# Patient Record
Sex: MALE | Race: WHITE | NOT HISPANIC OR LATINO | Employment: FULL TIME | URBAN - METROPOLITAN AREA
[De-identification: names, ages, dates, MRNs, and addresses within clinical notes are randomized per-mention and may not be internally consistent; named-entity substitution may affect disease eponyms.]

---

## 2019-08-05 ENCOUNTER — OFFICE VISIT (OUTPATIENT)
Dept: FAMILY MEDICINE | Facility: CLINIC | Age: 24
End: 2019-08-05
Payer: COMMERCIAL

## 2019-08-05 VITALS
SYSTOLIC BLOOD PRESSURE: 116 MMHG | HEART RATE: 71 BPM | BODY MASS INDEX: 29.55 KG/M2 | TEMPERATURE: 97.7 F | RESPIRATION RATE: 18 BRPM | OXYGEN SATURATION: 99 % | WEIGHT: 223 LBS | HEIGHT: 73 IN | DIASTOLIC BLOOD PRESSURE: 75 MMHG

## 2019-08-05 DIAGNOSIS — L98.9 SKIN LESION: ICD-10-CM

## 2019-08-05 DIAGNOSIS — Z11.3 SCREEN FOR STD (SEXUALLY TRANSMITTED DISEASE): ICD-10-CM

## 2019-08-05 DIAGNOSIS — Z00.00 HEALTH CARE MAINTENANCE: Primary | ICD-10-CM

## 2019-08-05 DIAGNOSIS — R53.83 FATIGUE, UNSPECIFIED TYPE: ICD-10-CM

## 2019-08-05 RX ORDER — BUSPIRONE HYDROCHLORIDE 15 MG/1
15 TABLET ORAL DAILY
Refills: 0 | COMMUNITY
Start: 2019-05-16 | End: 2022-02-23

## 2019-08-05 RX ORDER — DEXMETHYLPHENIDATE HYDROCHLORIDE 10 MG/1
10 TABLET ORAL
Refills: 0 | COMMUNITY
Start: 2019-07-07 | End: 2022-02-23

## 2019-08-05 RX ORDER — SERTRALINE HYDROCHLORIDE 100 MG/1
200 TABLET, FILM COATED ORAL DAILY
Refills: 0 | COMMUNITY
Start: 2019-06-10

## 2019-08-05 RX ORDER — CYPROHEPTADINE HYDROCHLORIDE 4 MG/1
TABLET ORAL PRN
Refills: 0 | COMMUNITY
Start: 2019-05-10 | End: 2022-02-23

## 2019-08-05 RX ORDER — ARIPIPRAZOLE 5 MG/1
10 TABLET ORAL DAILY
Refills: 2 | COMMUNITY
Start: 2019-07-15 | End: 2022-02-23

## 2019-08-05 ASSESSMENT — ENCOUNTER SYMPTOMS
INSOMNIA: 1
PANIC: 0
NERVOUS/ANXIOUS: 1
DECREASED CONCENTRATION: 1
DEPRESSION: 1

## 2019-08-05 ASSESSMENT — MIFFLIN-ST. JEOR: SCORE: 2059.36

## 2019-08-05 ASSESSMENT — PATIENT HEALTH QUESTIONNAIRE - PHQ9
10. IF YOU CHECKED OFF ANY PROBLEMS, HOW DIFFICULT HAVE THESE PROBLEMS MADE IT FOR YOU TO DO YOUR WORK, TAKE CARE OF THINGS AT HOME, OR GET ALONG WITH OTHER PEOPLE: VERY DIFFICULT
SUM OF ALL RESPONSES TO PHQ QUESTIONS 1-9: 22
SUM OF ALL RESPONSES TO PHQ QUESTIONS 1-9: 22

## 2019-08-05 ASSESSMENT — PAIN SCALES - GENERAL: PAINLEVEL: SEVERE PAIN (6)

## 2019-08-05 NOTE — NURSING NOTE
Chief Complaint   Patient presents with     Establish Care     Patient is here to establish a new PCP     Melodie Ann CMA 9:10 AM on 8/5/2019.

## 2019-08-05 NOTE — PROGRESS NOTES
"SUBJECTIVE:    Pt is a 23 year old male with pmh of     There is no problem list on file for this patient.      who is here for evaluation of had concerns including Establish Care (Patient is here to establish a new PCP).    Here in new pt physical.    Concerns:    One, one skin spot on scrotum, a few mo, no h/o HPV, unsure if had HPV shots or not    Two, outside psychiatrist sees him, below    FH: parents alive, two brothers, htn, obesity    SH: work at [employer], gets exercise about twice a week, diet varies    No Known Allergies    No PSH or other PMH    Ten pt ROS completed, o/w neg except psych, below      Current Outpatient Medications   Medication Sig Dispense Refill     ARIPiprazole (ABILIFY) 5 MG tablet 10 mg daily  2     busPIRone (BUSPAR) 15 MG tablet 15 mg daily  0     cyproheptadine (PERIACTIN) 4 MG tablet as needed  0     dexmethylphenidate (FOCALIN) 10 MG tablet 10 mg  0     sertraline (ZOLOFT) 100 MG tablet 200 mg daily  0       Social History     Tobacco Use     Smoking status: Former Smoker     Packs/day: 0.00     Years: 5.00     Pack years: 0.00     Types: Cigars, Hookah, Dip, chew, snus or snuff     Start date: 2011     Last attempt to quit: 2019     Years since quittin.1     Smokeless tobacco: Former User     Quit date: 2019   Substance Use Topics     Alcohol use: Not Currently     Drug use: Not Currently     Types: Cocaine, Marijuana, Benzodiazepines           OBJECTIVE:  /75 (BP Location: Right arm, Patient Position: Sitting, Cuff Size: Adult Large)   Pulse 71   Temp 97.7  F (36.5  C) (Oral)   Resp 18   Ht 1.861 m (6' 1.25\")   Wt 101.2 kg (223 lb)   SpO2 99%   BMI 29.22 kg/m    GENERAL APPEARANCE: Alert, no acute distress  EYES: PERRL, EOM normal, conjunctiva and lids normal  HENT: Ears and TMs normal, oral mucosa and posterior oropharynx normal  NECK: No adenopathy,masses or thyromegaly  RESP: lungs clear to auscultation   CV: normal rate, regular " rhythm, no murmur or gallop  ABDOMEN: soft, no organomegaly, masses or tenderness   (male): penis, scrotum, testes normal, no hernias  LYMPHATICS: No cervical, supraclavicular or inguinal adenopathy  MS: extremities normal, no peripheral edema  NEURO: Alert, oriented, speech and mentation normal  PSYCHE: mentation appears normal, affect and mood normal  Derm: lower scrotum one one mm raised flesh colored lesion    ASSESSMENT/PLAN:    Mental health: meds rvwd, sees outside psychiatry, treated for ADHD, depression/anxiety, and possible bipolar    Discussed importance of diet/exercise,tenzin in setting of now being on antipsychotic    No particular concerns but requests STD tests    He will get childhood shot reports and send to us as Mychart attachment, if no HPV shots done can consider--in meantime he will see derm re: scortal lesion, only one so could be skin tag but possible wart    YOEL VELIZ MD      Answers for HPI/ROS submitted by the patient on 8/5/2019   If you checked off any problems, how difficult have these problems made it for you to do your work, take care of things at home, or get along with other people?: Very difficult  PHQ9 TOTAL SCORE: 22  General Symptoms: No  Skin Symptoms: No  HENT Symptoms: No  EYE SYMPTOMS: No  HEART SYMPTOMS: No  LUNG SYMPTOMS: No  INTESTINAL SYMPTOMS: No  URINARY SYMPTOMS: No  REPRODUCTIVE SYMPTOMS: No  SKELETAL SYMPTOMS: No  BLOOD SYMPTOMS: No  NERVOUS SYSTEM SYMPTOMS: No  MENTAL HEALTH SYMPTOMS: Yes  Nervous or Anxious: Yes  Depression: Yes  Trouble sleeping: Yes  Trouble thinking or concentrating: Yes  Mood changes: Yes  Panic attacks: No  Yoel Veliz MD

## 2019-08-06 ASSESSMENT — PATIENT HEALTH QUESTIONNAIRE - PHQ9: SUM OF ALL RESPONSES TO PHQ QUESTIONS 1-9: 22

## 2019-08-21 ENCOUNTER — TELEPHONE (OUTPATIENT)
Dept: OTHER | Facility: OUTPATIENT CENTER | Age: 24
End: 2019-08-21

## 2019-08-21 NOTE — TELEPHONE ENCOUNTER
[facility] Telephone Intake    Date:  2019  Client Name:  Sudarshan Alonzo  Preferred Name:     MRN:  [id number]   :  1995       Age:  23 year old     Presenting Problem / Reason for Assessment   (Clinical History &Symptoms):   Address compulsive behavior    Suggested Program: CSB  Length of time experiencing Symptoms:  Ongoing issue    Seen Other Providers (if so, where):  MMICHELL. :  Dr. Deng ([facility])  Therapist:  Breanne Barksdale   Psychiatrist:  Steven Enciso ([facility])      Couples:  Is client bringing partner:  No      Medications:    Sertraline, abilify    Prescribing Physician:  Steven Enciso    Diagnosis (if known):  Depression, Anxiety, ADHD    Referral Source:  Internet    Legal:  Court ordered evaluation:    No  Any legal charges pending:   No  Is this county ordered:   No    Follow Up:    Insurance Benefits to be evaluated.  Note will be entered when validated.    Patient wishes to be contacted regarding Insurance benefits:  YES    Please Verify Registration    Please send Welcome Packet and document date sent.

## 2019-08-22 NOTE — TELEPHONE ENCOUNTER
.Per: WEB w/ [employer]/[place]  Copay$ 0   Ded$ 3,500; Met$ 3,500   Coins 10%    Out of Pocket Max$ 4,500 ; Met$ 3524.38     Psych testing require auth (14833/62870)? yes  Extended therapy require auth (01115)?  no    Exclusions:   Family Therapy (09683/15818)  NO    Marriage couple counseling  YES   Transgender/Gender Dysphoria not applicable   CSB not applicable   Sexual dysfunction not applicable    Patient Contacted about benefits:  LEFT VOICEMAIL  Date contacted: 08/22/2019

## 2019-09-12 ENCOUNTER — OFFICE VISIT (OUTPATIENT)
Dept: OTHER | Facility: OUTPATIENT CENTER | Age: 24
End: 2019-09-12
Payer: COMMERCIAL

## 2019-09-12 DIAGNOSIS — F32.A DEPRESSIVE DISORDER: ICD-10-CM

## 2019-09-12 DIAGNOSIS — F91.8 OTHER CONDUCT DISORDERS: Primary | ICD-10-CM

## 2019-09-12 NOTE — PROGRESS NOTES
[facility]  Program in Human Sexuality  Department of Family Medicine & Community Health  [facility]   [address]               [place] [address]  Phone: [phone]  Fax: [phone]  [email or web address]        Diagnostic Assessment Interview    Date of Service: 9/12/19  Client Name: Sudarshan Alonzo  YOB: 1995  24 year old  MRN:  [id number]  Gender/Gender Identity: Male  Treating Provider: Elmira Moncaad, Ph.D.  Program: B   Type of Session: Assessment  Present in Session: Client  Number of Minutes:  55    Ethnicity:       Any relevant cultural/Rastafarian issues? None disclosed or observed during this assessment.     Clinician introduced self and reviewed services and policies at [facility]. Clinician reviewed limits of confidentiality, which client expressed understanding of.     Referral Source Not stated.    Chief Complaint/Presenting Problem and Goals:    Sudarshan Alonzo is a 24 year-old cisgender male who self-reported a history of distressing sexual behaviors that feel beyond his control. Specifically, Sudarshan reported use of pornography that feels problematic to him in frequency and in content, as well as a pattern of visiting massage parlors approximately monthly. Sudarshan identified both of these sexual issues as highly related to depression. Sudarshan has attempted to control these behaviors on his own with little long-term success. His goal in therapy is to learn how to control his behavior.     Compulsive Sexual Behavior Therapy (CSB) Program-Specific Information    Sudarshan reported that he began watching pornography at 12. He currently watches pornography for about 20 minutes nightly while masturbating. He reported believing that he should be  past that phase in my life.  He reported increasing levels of distress as his stimuli of interest expand to genres that he sees as  taboo  (e.g., pornography depicting incest  relationships, or pornography marketed as  teen porn. ) Sudarshan reported that as his arousal increases, his  radar goes down  and he is more likely to look at these genres. This is the aspect of his current behavior that feels impulsive. Sudarshan reported that he has tried to go  cold turkey  with pornography use, and can refrain for several weeks before having a  burst  of increased use before returning to regular nightly use.     Sudarshan clarified that he doesn t have a values conflict with masturbation, but believes that daily masturbation is too frequent. He suggested once every two weeks as an appropriate frequency. Further, he reported wanting his masturbation to be associated with desire for real person in whom he s interested, and not as a response to feeling depression or low self-worth.     Sudarshan reported that someone introduced him to massage parlors when he was 17, and that he visits approximately once every 3-4 weeks. This distresses Sudarshan, as he noted that he visits massage parlors impulsively when he feels a  void.  He reported a significant amount of shame over this behavior. He did note that part of him likes the transactional nature of these anonymous encounters.    Sudarshan denied other forms of compulsive or problematic sexual behaviors.     Sudarshan reported that these issues have not interfered with his prior relationships or other areas of life, as he is  dealing with them privately.  He suspected that his pornography use may interfere with potential partnerships, as he sometimes chooses watching pornography over responding to women on dating apps. He also suggested that he may be more social were it not for his desire to watch pornography.     With respect to sexual function, Sudarshan reported one episode of erectile dysfunction which caused significant distress. He further reported some history of delayed ejaculation, which he believed may be related to his prescribed Sertraline.     Sudarshan denied  current concerns with respect to sexual orientation or gender identity.     Mental Health History:   Sudarshan reported that he is in therapy for depression, and has sessions about once per month for the past two years. He described himself as dysthymic with depressive episodes. His PHQ 9 was 17 at the time of assessment, indicating moderately severe depression.  Sudarshan also reported a history of  obsessions  and significant internalized anxiety. His FADUMO score at assessment was 12, indicating moderate anxiety.  Sudarshan reported taking 250mg/day of Sertraline which is monitored by a psychiatrist.   Mental health symptoms will be explored further in therapy.    Substance Use:   Sudarshan reported drinking socially with friends about once per week. Amount of use unknown. Sudarshan reported that he is careful about his intake because he does not want to develop problematic alcohol use.   Sudarshan reported that he used to smoke marijuana, but become aware of an association between marijuana use and development of schizophrenia. He reported being  convinced  that he was going to develop schizophrenia, and described this as a symptom of his obsessive thinking.      Medical History:     Current medication includes Sertraline 250mg/day.     No significant history of illness or injury.     Family History:    Sudarshan reported growing up with his mother, father, and two siblings. He described his household as  pretty repressed.  He reported that his parents were harsh and critical, and not loving. He described the parent/child dynamic in his household as  the parents are perfect and the kids are wrong.      Sudarshan reported some history of mental health issues in his family. He stated that his mother has anxiety and is obsessive, and that his father  likely has ADHD.  Sudarshan stated that he experiences a more pathological version of his parents' mental health symptoms.     Sudarshan has two older brothers, aged 35 and 39. Relationships not  explored during assessment.     Relationship History:    Sudarshan is not currently in a romantic or sexual relationship.     Sudarshan reported a romantic/sexual relationship last year which lasted 6 months. He stated that it was pleasant, but felt more like a friendship.     Sudarshan reported a significant relationship that began in his last year of high school through to his sophomore year of college. He reported that his family convinced him to break up due to their distance. He reports under-appreciating this relationship at the time. Sudarshan reported many casual sexual relationships through the remainder of college that were enjoyable for him.   Sudarshan reported questioning whether he is suited to monogamy, though identified a values conflict within himself on this issue. This will be further explored.     Educational History   Sudarshan has an undergraduate degree in History.    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 He reports satisfaction with this job in all realms.     Legal Issues:  None reported.     CONCLUSIONS    Strengths and Liabilities:     Sudarshan reported being committed and dedicated to his work.  Weaknesses reported including the aforementioned feelings around lack of control.     Symptoms:    Sudarshan reports the following symptoms:  - Sexual behavior, including pornography use and visiting massage parlors, which feels difficult to control  - Persistent depression and low self-worth as primary triggers for problematic sexual behaviors.   - PHQ9 of 17, indicating moderately severe depression.  - Ruminative and obsessive thinking     Presenting symptoms cause significant internal distress and interference with social and relational well-being.      Mental Status:   Appearance:  neat, well groomed  Behavior/relationship to examiner/demeanor:  Cooperative  Orientation: Oriented to person, place, time, situation  Speech Rate:  WNL  Speech  Spontaneity: WNL  Mood: reported as depressed and anxious  Affect:  anxious  Thought Process (Associations): linear, goal-directed  Thought Content:  WNL  Abnormal Perception:  none reported or observed  Attention/Concentration:  WNL  Language:  WNL  Insight: WNL  Judgment: WNL     DSM-5 Diagnosis:    F91.8 Unspecified Disruptive, Impulse-Control, and Conduct Disorder    F32.9 Unspecified Depressive Disorder  r/o Major Depressive Disorder  r/o Persistent Depressive Disorder    Conclusions/Recommendations/Initial Treatment Goals:     Sudarshan Alonzo is a 24 year old cisgender, heterosexual,  male who is seeking psychotherapy due to his significant distress over sexual behaviors that feel out of control. Specifically, he reported nightly pornography use with masturbation, and visits to massage parlors approximately monthly. He believes these behaviors stem from depression and low self-worth, and identified significant shame and guilt associated with them. His goal in therapy is to gain control over these behaviors and develop a sexual life that feels healthy and aligned with his core values.    Based on the client's current report of problematic/distressing sexual behavior that he has difficulty controlling, he meets criteria for an Other Specified Disruptive, Impulse-Control, and Conduct Disorder (F91.8)    Sudarshan also reported a significant experience of depression which he self-soothes with sexual behavior. At this time, he is diagnosed with an Unspecified Depressive Disorder (F32.9), with a rule-out for Persistent Depressive Disorder and Major Depressive Disorder. Sudarshan further noted a history of obsessive thinking, which will be further explored. At this time, it is unclear whether obsessive thinking may be a symptom of depression, or indicative of an obsessive-compulsive or other mental health diagnosis.    Sudarshan s symptoms are causing significant internal distress, and are impacting his psychological  and relational well-being. Based on the client's reported symptoms and impact on functioning, the plan for the patient is:    1. Start integrative individual therapy with a provider specializing in problematic sexual behavior.  2. Continue care with individual therapist for depression  3. Continue care with psychiatrist for medication management.     Elmira Moncada, PhD  Postdoctoral Fellow

## 2019-09-12 NOTE — Clinical Note
"Hey there,There was a sentence I didn't understand in the family section: \"He reported that what his parents preset with mildly, he presents with \"pathologically.\" \"Also, some reports and reported - just look through and make sure there is consistent tense. I'll sign it and so, when you go to make edits, you will create an addendum. Once done with edits, you can sign the addendum. No need to send back to me.RBW"

## 2019-09-26 ENCOUNTER — OFFICE VISIT (OUTPATIENT)
Dept: OTHER | Facility: OUTPATIENT CENTER | Age: 24
End: 2019-09-26
Payer: COMMERCIAL

## 2019-09-26 DIAGNOSIS — F91.8 OTHER CONDUCT DISORDERS: Primary | ICD-10-CM

## 2019-09-26 DIAGNOSIS — F32.A DEPRESSIVE DISORDER: ICD-10-CM

## 2019-09-26 DIAGNOSIS — F42.2 MIXED OBSESSIONAL THOUGHTS AND ACTS: ICD-10-CM

## 2019-09-27 NOTE — PROGRESS NOTES
[facility] -  Case Progress Note    Date of Service: 19   Name: Sudarshan Alonzo  : 1995  Medical Record Number: [id number]  Treating Provider: Elmira Moncada, PhD - Postdoctoral Fellow  Type of Session: Individual  Present in Session: Client  Number of Minutes:  55    Current Symptoms/Status:      Sexual behavior, including pornography use and visiting massage parlors, which feels difficult to control    Persistent depression and low self-worth as primary triggers for problematic sexual behaviors.     PHQ9 of 17, indicating moderately severe depression.    Ruminative and obsessive thinking      Sudarshan further disclosed significant history of obsessive thinking, beginning age 17. He reported highly distressing and intrusive sexual thoughts and images that would onset in the morning and persist throughout the day, to the extent that he was unable to concentrate on schoolwork on relationships. He was unable to suppress these thoughts. He reported receiving a diagnosis of OCD shortly thereafter. He reported trying several medications, and found that Sertraline was the only medication that offered some relief from his intrusions. He has been progressively prescribed higher doses of sertraline in effort to manage intrusions. The diagnosis of 300.3 Obsessive Compulsive Disorder is applied.    Progress Toward Treatment Goals:   Sudarshan provided further clarification of mental health symptom status to contextualize his problematic sexual behavior     Intervention: Modality and Description:  Sudarshan arrived on time and was engaged throughout the appointment. He presented with a high level of distress. He reported a boundary violation since intake, which he reported having rationalized following the experience of acceptance during his diagnostic intake. He reported viewing himself with disgust, and insisted that the only way to control his behavior was by knowing that this clinician viewed him  similarly. He natalie a boundary of no sexual activity, reporting that he feels incapable of exercising control. He provided clarification of OCD symptom history and clinician explored history, development, and current experience of symptoms as they relate to presenting concerns. We began exploring his significant experience of shame about his behavior, and the core beliefs about himself that are associated with it. Clinician utilized empathic attunement, reflective feedback, and psychoeducation on OCD, CSB, and treatment philosophy throughout.     Assignment:  Acquire records from psychiatric provider and bring to next appointment    DSM-5 Diagnoses:    312.89 Other specified Disruptive, Impulse-Control, and Conduct Disorder  300.3 Obsessive Compulsive Disorder i   311 Unspecified Depressive Disorder  r/o Major Depressive Disorder  r/o Persistent Depressive Disorder    Plan/Need for Future Services:  Return for therapy in one week to treat diagnosed problems.        Elmira Moncada, PhD  Postdoctoral Fellow

## 2019-10-01 NOTE — PROGRESS NOTES
I did not personally see the patient.  I reviewed and agree with the assessment and plan as documented in this note.     Philomena Hinkle PsyD, LP

## 2019-10-03 ENCOUNTER — OFFICE VISIT (OUTPATIENT)
Dept: OTHER | Facility: OUTPATIENT CENTER | Age: 24
End: 2019-10-03
Payer: COMMERCIAL

## 2019-10-03 DIAGNOSIS — F42.2 MIXED OBSESSIONAL THOUGHTS AND ACTS: ICD-10-CM

## 2019-10-03 DIAGNOSIS — F91.8 OTHER CONDUCT DISORDERS: Primary | ICD-10-CM

## 2019-10-03 DIAGNOSIS — F32.A DEPRESSIVE DISORDER: ICD-10-CM

## 2019-10-07 ASSESSMENT — ANXIETY QUESTIONNAIRES
1. FEELING NERVOUS, ANXIOUS, OR ON EDGE: MORE THAN HALF THE DAYS
3. WORRYING TOO MUCH ABOUT DIFFERENT THINGS: MORE THAN HALF THE DAYS
6. BECOMING EASILY ANNOYED OR IRRITABLE: NOT AT ALL
GAD7 TOTAL SCORE: 12
7. FEELING AFRAID AS IF SOMETHING AWFUL MIGHT HAPPEN: NOT AT ALL
2. NOT BEING ABLE TO STOP OR CONTROL WORRYING: MORE THAN HALF THE DAYS
5. BEING SO RESTLESS THAT IT IS HARD TO SIT STILL: NEARLY EVERY DAY

## 2019-10-07 ASSESSMENT — PATIENT HEALTH QUESTIONNAIRE - PHQ9
5. POOR APPETITE OR OVEREATING: NEARLY EVERY DAY
SUM OF ALL RESPONSES TO PHQ QUESTIONS 1-9: 17

## 2019-10-08 ASSESSMENT — ANXIETY QUESTIONNAIRES: GAD7 TOTAL SCORE: 12

## 2019-10-17 NOTE — PROGRESS NOTES
"  [facility] -  Case Progress Note    Date of Service: 10/03/19   Name: Sudarshan Alonzo  : 1995  Medical Record Number: [id number]  Treating Provider: Elmira Moncada, PhD - Postdoctoral Fellow  Type of Session: Individual  Present in Session: Client  Number of Minutes:  55    Current Symptoms/Status:      Sexual behavior, including pornography use and visiting massage parlors, which feels difficult to control    Persistent depression and low self-worth as primary triggers for problematic sexual behaviors.     PHQ9 of 17, indicating moderately severe depression.    Ruminative and obsessive thinking      Progress Toward Treatment Goals:   Sudarshan reported a boundary violation, specifically, watching adult pornography. He reported feeling \"okay\" with this, as he was able to remain in control of his behavior.   Sudarshan reported engaging in alternate forms of self-care and self-soothing that are within his boundaries    Intervention: Modality and Description:  Sudarshan arrived on time and was engaged throughout the appointment. Intervention consisted of education, and supportive dynamic therapy. He reported that the \"sheyla\" discussion of the extent of his symptoms during last session was helpful. We processed his reaction of being able to regain control over his sexual behavior following boundary violation. We continued to process the shame underlying much of his behavior and core beliefs. Session also focused on some recent challenges at work, and underlying shame that it has triggered. Sudarshan was responsive to intervention.      Assignment:  Acquire records from psychiatric provider and bring to next appointment    DSM-5 Diagnoses:    312.89 Other specified Disruptive, Impulse-Control, and Conduct Disorder  300.3 Obsessive Compulsive Disorder i   311 Unspecified Depressive Disorder  r/o Major Depressive Disorder  r/o Persistent Depressive Disorder    Plan/Need for Future Services:  Return for " therapy in one week to treat diagnosed problems.        Elmira Moncada, PhD  Postdoctoral Fellow

## 2019-11-11 ENCOUNTER — TELEPHONE (OUTPATIENT)
Dept: FAMILY MEDICINE | Facility: CLINIC | Age: 24
End: 2019-11-11

## 2019-11-11 DIAGNOSIS — F31.9 BIPOLAR DISORDER (H): ICD-10-CM

## 2019-11-11 DIAGNOSIS — F41.9 ANXIETY: Primary | ICD-10-CM

## 2019-11-11 DIAGNOSIS — F32.A DEPRESSION: ICD-10-CM

## 2019-11-11 NOTE — TELEPHONE ENCOUNTER
[facility] Call Center    Phone Message    May a detailed message be left on voicemail: yes    Reason for Call: Other: pt requesting Dr. Veliz to send a referral for him to Dr. Elio Roger, psychiatrist, [facility] in the [facility], [address], [facility] [address], ph# [phone].  Also, pt is asking Dr. Veliz IF Dr. BENAVIDES knows a therapist and psyciatrist who specialize in OCD and anxiety. Pt would like a referral to that provider/providers as well. Please call pt to let him know that the referral (s) have been sent on his behalf. pt is indicating that the need is URGENT. pt also wants Dr. Veliz/Care team to know that his mother came into the [facility] to help him at this time and pt is hoping that the requested referrals can be put into place asap. Thank you.     Action Taken: Message routed to:  [facility] ([facility]

## 2019-11-12 ENCOUNTER — TELEPHONE (OUTPATIENT)
Dept: PSYCHIATRY | Facility: CLINIC | Age: 24
End: 2019-11-12

## 2019-11-12 NOTE — TELEPHONE ENCOUNTER
Compa Evangelista, LMFT  You; Nikhil Jacinto MD; Elio Veliz MD 19 minutes ago (10:52 AM)      Hello   I am happy to see someone who has OCD and knwo the basic treatment approaches, but a program setting might be better for this patient. I have heard good things from others about [facility] (https://www.anxietytreatmentresources.[email or web address]/). They might be worth checking out.     Josep - do you know of anyone you refer to for OCD-targeted treatments?     Thanks   Stef randolph

## 2019-11-12 NOTE — TELEPHONE ENCOUNTER
PSYCHIATRY CLINIC PHONE INTAKE     SERVICES REQUESTED / INTERESTED IN          Med Management and therapy    Presenting Problem and Brief History                              What would you like to be seen for? (brief description):  Pt was diagnosed with OCD and anxiety at 17 years of age. His OCD manifests in intrusive thoughts, however he wasn't comfortable sharing what those thought are. His intrusive thoughts are starting to impact his work performance and relationships. Sometimes he has trouble falling asleep. He doesn't get panic attacks but he gets very anxious, triggers can be social rejection. He currently takes sertraline 250mg, dexmethyphendate 10mg 6 times a day, xanax 45mg 3 times a day (currently taking as needed). Pt has mental health providers but wants to try new providers.     Have you received a mental health diagnosis? Yes   Which one (s): OCD, ADHD growing up  Is there any history of developmental delay?  No   Are you currently seeing a mental health provider?  Yes            Who / month last seen:  [facility] - Steven Arellano , psychiatrist. Nader Mcdaniel, therapist (independent)  Do you have mental health records elsewhere?  Yes  Will you sign a release so we can obtain them?  Yes    Have you ever been hospitalized for psychiatric reasons?  No  Describe:  NA    Do you have current thoughts of self-harm?  No    Do you currently have thoughts of harming others?  No       Substance Use History     Do you have any history of alcohol / illicit drug use?  No  Describe:  NA  Have you ever received treatment for this?  No    Describe:  NA     Social History     Does the patient have a guardian?  No    Name / number: NA  Have you had an ACT team in last 12 months?  No  Describe: NA   Do you have any current or past legal issues?  No  Describe: NA   OK to leave a detailed voicemail?  Yes    Medical/ Surgical History                                 There is no problem list on file for  this patient.         Medications             Current Outpatient Medications   Medication Sig Dispense Refill     ARIPiprazole (ABILIFY) 5 MG tablet 10 mg daily  2     busPIRone (BUSPAR) 15 MG tablet 15 mg daily  0     cyproheptadine (PERIACTIN) 4 MG tablet as needed  0     dexmethylphenidate (FOCALIN) 10 MG tablet 10 mg  0     sertraline (ZOLOFT) 100 MG tablet 200 mg daily  0         DISPOSITION      11/12/19 Intake completed. Sending to Brenton Crawford for review and adding to [facility].   Carolina Lopez,

## 2019-11-12 NOTE — TELEPHONE ENCOUNTER
1--I signed referral to preferred psychiatrist Dr Roger    2--please ask Stef Evangelista if he knows of OCD/anxiety therapy specialist, let me know; also, patient can ask his psychiatrist if they know of one

## 2019-11-12 NOTE — TELEPHONE ENCOUNTER
Nikhil Jacinto MD  You; Compa Evangelista LMFT; Elio Veliz MD 4 minutes ago (11:08 AM)      I recently have had experience with 2 patients of mine going to [facility] program. They have specialized programs for OCD treatment and have referred patients of mine to their own OCD therapists as well.     Routing comment

## 2019-11-12 NOTE — TELEPHONE ENCOUNTER
Thanks all!    Emely can you pass this info on to the patient, what Stef Evangelista and Dr Jacinto suggested? The patient would have to check with insurance to see if any place is covered, and make their own appt. If referral from me needed, I'd provide

## 2022-02-23 ENCOUNTER — LAB (OUTPATIENT)
Dept: LAB | Facility: CLINIC | Age: 27
End: 2022-02-23
Payer: COMMERCIAL

## 2022-02-23 ENCOUNTER — DOCUMENTATION ONLY (OUTPATIENT)
Dept: LAB | Facility: CLINIC | Age: 27
End: 2022-02-23

## 2022-02-23 ENCOUNTER — OFFICE VISIT (OUTPATIENT)
Dept: FAMILY MEDICINE | Facility: CLINIC | Age: 27
End: 2022-02-23
Payer: COMMERCIAL

## 2022-02-23 VITALS
WEIGHT: 230 LBS | HEART RATE: 72 BPM | OXYGEN SATURATION: 98 % | DIASTOLIC BLOOD PRESSURE: 83 MMHG | BODY MASS INDEX: 29.52 KG/M2 | SYSTOLIC BLOOD PRESSURE: 135 MMHG | RESPIRATION RATE: 16 BRPM | HEIGHT: 74 IN

## 2022-02-23 DIAGNOSIS — R21 RASH: ICD-10-CM

## 2022-02-23 DIAGNOSIS — R35.0 URINE FREQUENCY: ICD-10-CM

## 2022-02-23 DIAGNOSIS — L65.9 HAIR LOSS: Primary | ICD-10-CM

## 2022-02-23 DIAGNOSIS — Z11.3 SCREEN FOR STD (SEXUALLY TRANSMITTED DISEASE): ICD-10-CM

## 2022-02-23 DIAGNOSIS — K62.5 RECTAL BLEEDING: ICD-10-CM

## 2022-02-23 DIAGNOSIS — L98.9 SKIN LESION: ICD-10-CM

## 2022-02-23 DIAGNOSIS — L65.9 HAIR LOSS: ICD-10-CM

## 2022-02-23 DIAGNOSIS — Z00.00 HEALTH CARE MAINTENANCE: ICD-10-CM

## 2022-02-23 LAB
ALBUMIN UR-MCNC: NEGATIVE MG/DL
ANION GAP SERPL CALCULATED.3IONS-SCNC: 7 MMOL/L (ref 3–14)
APPEARANCE UR: CLEAR
BASOPHILS # BLD AUTO: 0 10E3/UL (ref 0–0.2)
BASOPHILS NFR BLD AUTO: 1 %
BILIRUB UR QL STRIP: NEGATIVE
BUN SERPL-MCNC: 15 MG/DL (ref 7–30)
CALCIUM SERPL-MCNC: 10 MG/DL (ref 8.5–10.1)
CHLORIDE BLD-SCNC: 102 MMOL/L (ref 94–109)
CHOLEST SERPL-MCNC: 212 MG/DL
CO2 SERPL-SCNC: 29 MMOL/L (ref 20–32)
COLOR UR AUTO: YELLOW
CREAT SERPL-MCNC: 1.01 MG/DL (ref 0.66–1.25)
EOSINOPHIL # BLD AUTO: 0.3 10E3/UL (ref 0–0.7)
EOSINOPHIL NFR BLD AUTO: 3 %
ERYTHROCYTE [DISTWIDTH] IN BLOOD BY AUTOMATED COUNT: 13.1 % (ref 10–15)
FASTING STATUS PATIENT QL REPORTED: NO
FERRITIN SERPL-MCNC: 42 NG/ML (ref 26–388)
GFR SERPL CREATININE-BSD FRML MDRD: >90 ML/MIN/1.73M2
GLUCOSE BLD-MCNC: 89 MG/DL (ref 70–99)
GLUCOSE UR STRIP-MCNC: NEGATIVE MG/DL
HBV CORE AB SERPL QL IA: NONREACTIVE
HBV SURFACE AB SERPL IA-ACNC: 0.81 M[IU]/ML
HBV SURFACE AG SERPL QL IA: NONREACTIVE
HCT VFR BLD AUTO: 47.4 % (ref 40–53)
HDLC SERPL-MCNC: 51 MG/DL
HGB BLD-MCNC: 15.5 G/DL (ref 13.3–17.7)
HGB UR QL STRIP: NEGATIVE
HIV 1+2 AB+HIV1 P24 AG SERPL QL IA: NONREACTIVE
IMM GRANULOCYTES # BLD: 0 10E3/UL
IMM GRANULOCYTES NFR BLD: 0 %
INR PPP: 1.04 (ref 0.85–1.15)
IRON SATN MFR SERPL: 19 % (ref 15–46)
IRON SERPL-MCNC: 69 UG/DL (ref 35–180)
KETONES UR STRIP-MCNC: NEGATIVE MG/DL
LDLC SERPL CALC-MCNC: 136 MG/DL
LEUKOCYTE ESTERASE UR QL STRIP: NEGATIVE
LYMPHOCYTES # BLD AUTO: 2.2 10E3/UL (ref 0.8–5.3)
LYMPHOCYTES NFR BLD AUTO: 27 %
MCH RBC QN AUTO: 27.3 PG (ref 26.5–33)
MCHC RBC AUTO-ENTMCNC: 32.7 G/DL (ref 31.5–36.5)
MCV RBC AUTO: 84 FL (ref 78–100)
MONOCYTES # BLD AUTO: 0.9 10E3/UL (ref 0–1.3)
MONOCYTES NFR BLD AUTO: 11 %
NEUTROPHILS # BLD AUTO: 4.7 10E3/UL (ref 1.6–8.3)
NEUTROPHILS NFR BLD AUTO: 58 %
NITRATE UR QL: NEGATIVE
NONHDLC SERPL-MCNC: 161 MG/DL
NRBC # BLD AUTO: 0 10E3/UL
NRBC BLD AUTO-RTO: 0 /100
PH UR STRIP: 6 [PH] (ref 5–7)
PLATELET # BLD AUTO: 318 10E3/UL (ref 150–450)
POTASSIUM BLD-SCNC: 3.9 MMOL/L (ref 3.4–5.3)
RBC # BLD AUTO: 5.67 10E6/UL (ref 4.4–5.9)
RBC URINE: 1 /HPF
SODIUM SERPL-SCNC: 138 MMOL/L (ref 133–144)
SP GR UR STRIP: 1.02 (ref 1–1.03)
T PALLIDUM AB SER QL: NONREACTIVE
TIBC SERPL-MCNC: 361 UG/DL (ref 240–430)
TRIGL SERPL-MCNC: 125 MG/DL
TSH SERPL DL<=0.005 MIU/L-ACNC: 2.56 MU/L (ref 0.4–4)
UROBILINOGEN UR STRIP-MCNC: NORMAL MG/DL
WBC # BLD AUTO: 8.1 10E3/UL (ref 4–11)
WBC URINE: <1 /HPF

## 2022-02-23 PROCEDURE — 86704 HEP B CORE ANTIBODY TOTAL: CPT | Mod: 90 | Performed by: PATHOLOGY

## 2022-02-23 PROCEDURE — 87491 CHLMYD TRACH DNA AMP PROBE: CPT | Mod: 90 | Performed by: PATHOLOGY

## 2022-02-23 PROCEDURE — 80061 LIPID PANEL: CPT | Performed by: PATHOLOGY

## 2022-02-23 PROCEDURE — 83550 IRON BINDING TEST: CPT | Performed by: PATHOLOGY

## 2022-02-23 PROCEDURE — 36415 COLL VENOUS BLD VENIPUNCTURE: CPT | Performed by: PATHOLOGY

## 2022-02-23 PROCEDURE — 99000 SPECIMEN HANDLING OFFICE-LAB: CPT | Performed by: PATHOLOGY

## 2022-02-23 PROCEDURE — 85610 PROTHROMBIN TIME: CPT | Performed by: PATHOLOGY

## 2022-02-23 PROCEDURE — 84443 ASSAY THYROID STIM HORMONE: CPT | Performed by: PATHOLOGY

## 2022-02-23 PROCEDURE — 82728 ASSAY OF FERRITIN: CPT | Performed by: PATHOLOGY

## 2022-02-23 PROCEDURE — 90471 IMMUNIZATION ADMIN: CPT | Performed by: FAMILY MEDICINE

## 2022-02-23 PROCEDURE — 86780 TREPONEMA PALLIDUM: CPT | Mod: 90 | Performed by: PATHOLOGY

## 2022-02-23 PROCEDURE — 85025 COMPLETE CBC W/AUTO DIFF WBC: CPT | Performed by: PATHOLOGY

## 2022-02-23 PROCEDURE — 86706 HEP B SURFACE ANTIBODY: CPT | Mod: 90 | Performed by: PATHOLOGY

## 2022-02-23 PROCEDURE — 99395 PREV VISIT EST AGE 18-39: CPT | Mod: 25 | Performed by: FAMILY MEDICINE

## 2022-02-23 PROCEDURE — 87591 N.GONORRHOEAE DNA AMP PROB: CPT | Mod: 90 | Performed by: PATHOLOGY

## 2022-02-23 PROCEDURE — 87340 HEPATITIS B SURFACE AG IA: CPT | Mod: 90 | Performed by: PATHOLOGY

## 2022-02-23 PROCEDURE — 80048 BASIC METABOLIC PNL TOTAL CA: CPT | Performed by: PATHOLOGY

## 2022-02-23 PROCEDURE — 81001 URINALYSIS AUTO W/SCOPE: CPT | Performed by: PATHOLOGY

## 2022-02-23 PROCEDURE — 90686 IIV4 VACC NO PRSV 0.5 ML IM: CPT | Performed by: FAMILY MEDICINE

## 2022-02-23 PROCEDURE — 87389 HIV-1 AG W/HIV-1&-2 AB AG IA: CPT | Mod: 90 | Performed by: PATHOLOGY

## 2022-02-23 RX ORDER — BUPROPION HYDROCHLORIDE 150 MG/1
150 TABLET ORAL DAILY
COMMUNITY
Start: 2022-01-26

## 2022-02-23 RX ORDER — ATOMOXETINE 100 MG/1
100 CAPSULE ORAL DAILY
COMMUNITY
Start: 2022-01-26

## 2022-02-23 RX ORDER — ALFUZOSIN HYDROCHLORIDE 10 MG/1
1 TABLET, EXTENDED RELEASE ORAL DAILY
COMMUNITY
Start: 2021-11-04 | End: 2022-02-23

## 2022-02-23 RX ORDER — TADALAFIL 10 MG/1
10 TABLET ORAL
COMMUNITY
Start: 2021-09-05 | End: 2022-06-06

## 2022-02-23 ASSESSMENT — ANXIETY QUESTIONNAIRES
IF YOU CHECKED OFF ANY PROBLEMS ON THIS QUESTIONNAIRE, HOW DIFFICULT HAVE THESE PROBLEMS MADE IT FOR YOU TO DO YOUR WORK, TAKE CARE OF THINGS AT HOME, OR GET ALONG WITH OTHER PEOPLE: SOMEWHAT DIFFICULT
2. NOT BEING ABLE TO STOP OR CONTROL WORRYING: SEVERAL DAYS
6. BECOMING EASILY ANNOYED OR IRRITABLE: NOT AT ALL
7. FEELING AFRAID AS IF SOMETHING AWFUL MIGHT HAPPEN: NOT AT ALL
1. FEELING NERVOUS, ANXIOUS, OR ON EDGE: SEVERAL DAYS
3. WORRYING TOO MUCH ABOUT DIFFERENT THINGS: SEVERAL DAYS
5. BEING SO RESTLESS THAT IT IS HARD TO SIT STILL: MORE THAN HALF THE DAYS
GAD7 TOTAL SCORE: 6

## 2022-02-23 ASSESSMENT — PAIN SCALES - GENERAL: PAINLEVEL: NO PAIN (0)

## 2022-02-23 ASSESSMENT — PATIENT HEALTH QUESTIONNAIRE - PHQ9
SUM OF ALL RESPONSES TO PHQ QUESTIONS 1-9: 10
5. POOR APPETITE OR OVEREATING: SEVERAL DAYS

## 2022-02-23 NOTE — NURSING NOTE
Sudarshan Alonzo is a 26 year old male patient that presents today in clinic for the following:    Chief Complaint   Patient presents with     Physical     The patient's allergies and medications were reviewed as noted. A set of vitals were recorded as noted without incident. The patient does not have any other questions for the provider.    Rob Mobley, EMT at 1:33 PM on 2/23/2022

## 2022-02-23 NOTE — NURSING NOTE
Rapid Response Epic Documentation     Situation:   Pt had labs drawn and got lightheaded, sweaty, and passed out. RRT called.     Objective:  Pt experienced vasal vagal      Assessment:    Pt is A&Ox4, diaphoretic and reclined in lab chair. Pt has no complaints and says he is feeling much better.         Mental Status: Alert  CMS: Intact  Stroke Scale: Negative  EKG: Not Performed      Treatment:  Pt stood up from recliner and feels fine. Pt went home.    Medication: None      Location:    Lab      Disposition:      Stable (D/C home)    Protocol Used:     Other Syncope

## 2022-02-24 LAB
C TRACH DNA SPEC QL NAA+PROBE: NEGATIVE
N GONORRHOEA DNA SPEC QL NAA+PROBE: NEGATIVE

## 2022-02-24 ASSESSMENT — ANXIETY QUESTIONNAIRES: GAD7 TOTAL SCORE: 6

## 2022-02-28 ENCOUNTER — TELEPHONE (OUTPATIENT)
Dept: GASTROENTEROLOGY | Facility: CLINIC | Age: 27
End: 2022-02-28
Payer: COMMERCIAL

## 2022-02-28 DIAGNOSIS — Z11.59 ENCOUNTER FOR SCREENING FOR OTHER VIRAL DISEASES: Primary | ICD-10-CM

## 2022-02-28 NOTE — TELEPHONE ENCOUNTER
Screening Questions  BlueKIND OF PREP RedLOCATION [review exclusion criteria] GreenSEDATION TYPE    1. Have you had a positive covid test in the last 90 days? N     2. Are you active on mychart? Y    3. What insurance is in the chart? [facility]     3.  Ordering/Referring Provider: Elio Veliz MD    4. BMI 29.5 [BMI OVER 40-EXTENDED PREP]  If greater than 40 review exclusion criteria [PAC APPT IF @ [facility]]    5.  Respiratory Screening :  [If yes to any of the following HOSPITAL setting only]     Do you use daily home oxygen? N    Do you have mod to severe Obstructive Sleep Apnea? ?  [OKAY @ [facility] SH PH RI]   Do you have Pulmonary Hypertension? N     Do you have UNCONTROLLED asthma? N      6. Have you had a heart or lung transplant? N      7. Are you currently on dialysis? N [ If yes, G-PREP & HOSPITAL setting only]     8. Do you have chronic kidney disease? N [ If yes, G-PREP ]    9. Have you had a stroke or Transient ischemic attack (TIA) within 6 months? N (If yes, do not schedule at [facility])    10. In the past 6 months, have you had any heart related issues including cardiomyopathy or heart attack? N        If yes, did it require cardiac stenting or other implantable device? N      11. Do you have any implantable devices in your body (pacemaker, defib, LVAD)? N (If yes, schedule at U)    12. Do you take nitroglycerin? N   If yes, how often? N  (if yes, HOSPITAL setting ONLY)    13. Are you currently taking any blood thinners? N   [IF YES, INFORM PATIENT TO FOLLOW UP W/ ORDERING PROVIDER FOR BRIDGING INSTRUCTIONS]     14. Are you a diabetic? N   [ If yes, G-PREP ]    15. [FEMALES] Are you currently pregnant? NA    If yes, how many weeks? N    16. Are you taking any prescription pain medications on a routine schedule?  N  [ If yes, EXTENDED PREP.]    17. Do you have any chemical dependencies such as alcohol, street drugs, or methadone?  N [If yes, MAC]    18. Do you have any history of  post-traumatic stress syndrome, severe anxiety or history of psychosis?  N  [If yes, MAC]    19. Do you transfer independently?  Y    20.  Do you have any issues with constipation?  N  [ If yes, EXTENDED PREP.]    21. Preferred LOCAL Pharmacy for Pre Prescription  [id number] - [place], [place] - [address] AT [facility] [address]  Scheduling Details      Caller : DAI  (Please ask for phone number if not scheduled by patient)    Type of Procedure Scheduled: COLONOSCOPY   Which Colonoscopy Prep was Sent?: AMBIKA M   IAN CF PATIENTS & GROEN'S PATIENTS NEEDS EXTENDED PREP  Surgeon: CHARLETTE   Date of Procedure: 4/6  [facility] [facility]      Sedation Type: CS  Conscious Sedation- Needs  for 6 hours after the procedure  MAC/General-Needs  for 24 hours after procedure    Pre-op Required at [facility] [place] [facility], [place], [place] and OR for MAC sedation: N  (advise patient they will need a pre-op prior to procedure -)      Informed patient they will need an adult  Y  Cannot take any type of public or medical transportation alone    Pre-Procedure Covid test to be completed at [facility] or Externally: Y    Confirmed Nurse will call to complete assessment Y    Additional comments:

## 2022-03-01 ENCOUNTER — PRE VISIT (OUTPATIENT)
Dept: UROLOGY | Facility: CLINIC | Age: 27
End: 2022-03-01
Payer: COMMERCIAL

## 2022-03-01 ENCOUNTER — TELEPHONE (OUTPATIENT)
Dept: FAMILY MEDICINE | Facility: CLINIC | Age: 27
End: 2022-03-01
Payer: COMMERCIAL

## 2022-03-01 NOTE — TELEPHONE ENCOUNTER
MEDICAL RECORDS REQUEST   [facility]  Urology Clinic  [address] [place] [address]  PHONE: [phone]  Fax: [phone]        FUTURE VISIT INFORMATION                                                   Sudarshan Alonzo, : 1995 scheduled for future visit at [facility]    APPOINTMENT INFORMATION:    Date: 2022    Provider:  Ana M Willett PA    Reason for Visit/Diagnosis: Urine frequency [R35.0]    REFERRAL INFORMATION:    Referring provider:  Elio Veliz MD    Referring providers clinic:  [facility]    RECORDS REQUESTED FOR VISIT                                                     NOTES  STATUS/DETAILS   OFFICE NOTE from referring provider  yes, 2022 -- Elio Veliz MD in [facility]   MEDICATION LIST  yes   LABS     URINALYSIS (UA)  yes, 2022     PRE-VISIT CHECKLIST      Record collection complete Yes   Appointment appropriately scheduled           (right time/right provider) Yes   Joint diagnostic appointment coordinated correctly          (ensure right order & amount of time) Yes   MyChart activation Yes   Questionnaire complete If no, please explain pending     Action 2022 JTV 11:28am   Action Taken CSS called patient. Patient did not . CSS LVM for patient to return call.

## 2022-03-01 NOTE — TELEPHONE ENCOUNTER
LVM w/ pcc number for pt to callback and schedule follow up w/ pcp after all other consult appointments - sometime in early June per provider request

## 2022-03-03 ENCOUNTER — OFFICE VISIT (OUTPATIENT)
Dept: UROLOGY | Facility: CLINIC | Age: 27
End: 2022-03-03
Attending: FAMILY MEDICINE
Payer: COMMERCIAL

## 2022-03-03 ENCOUNTER — HOSPITAL ENCOUNTER (OUTPATIENT)
Facility: CLINIC | Age: 27
Discharge: HOME | End: 2022-03-03
Attending: FAMILY MEDICINE | Admitting: FAMILY MEDICINE
Payer: COMMERCIAL

## 2022-03-03 VITALS — DIASTOLIC BLOOD PRESSURE: 75 MMHG | HEART RATE: 94 BPM | SYSTOLIC BLOOD PRESSURE: 130 MMHG

## 2022-03-03 DIAGNOSIS — R39.15 URINARY URGENCY: Primary | ICD-10-CM

## 2022-03-03 DIAGNOSIS — R33.9 INCOMPLETE BLADDER EMPTYING: ICD-10-CM

## 2022-03-03 PROCEDURE — 87109 MYCOPLASMA: CPT

## 2022-03-03 PROCEDURE — 99204 OFFICE O/P NEW MOD 45 MIN: CPT | Performed by: PHYSICIAN ASSISTANT

## 2022-03-03 PROCEDURE — 87076 CULTURE ANAEROBE IDENT EACH: CPT

## 2022-03-03 PROCEDURE — 99000 SPECIMEN HANDLING OFFICE-LAB: CPT | Performed by: PATHOLOGY

## 2022-03-03 RX ORDER — SOLIFENACIN SUCCINATE 10 MG/1
10 TABLET, FILM COATED ORAL DAILY
COMMUNITY
Start: 2022-02-25 | End: 2022-06-20

## 2022-03-03 RX ORDER — ALFUZOSIN HYDROCHLORIDE 10 MG/1
1 TABLET, EXTENDED RELEASE ORAL DAILY
COMMUNITY
Start: 2022-02-24 | End: 2022-06-20

## 2022-03-03 RX ORDER — TADALAFIL 20 MG/1
20 TABLET ORAL DAILY
COMMUNITY
Start: 2022-02-24 | End: 2022-06-20

## 2022-03-03 ASSESSMENT — PAIN SCALES - GENERAL: PAINLEVEL: NO PAIN (0)

## 2022-03-03 NOTE — PATIENT INSTRUCTIONS
"    PLAN:   - Your urinalysis is normal (from 2/23).  Your gonorrhea and chlamydia cultures are normal (from 2/23)  - Today we will send ureaplasma and mycoplasma cultures  - Uroflow and postvoid residual (to make sure you are emptying)  - Schedule voiding cystourethrogram and retrograde urethrogram - to rule out a urethral stricture (scar tissue in the urethra)   - Bladder diary (x 24 hours) - to figure out how much you drink and how your bladder behaves  - Continue Vesicare (solifenacin) 10mg daily  - Continue alfuzosin 10mg daily  - Agree with colonoscopy  - Agree with dermatologist (take a look at the penile lesion)   - Return 6 weeks after completing these tests.     [facility]       BLADDER DIARY    INSTRUCTIONS:    1.  Fluid Intake    Record the amounts of all fluids you drink.    Record the types of fluids you drink (such as coffee, pop, and water). Try to change the way you would \"normally\" drink.   3. Accidents/Leaking    Record when an accident occurs.    Did you feel a strong sense of urgency with the accident?    Record what you were doing when the accident occurred.   2. Urination    Record every time you urinate.    Measure / record the amount.   Maintain this diary for two full days  and bring it with you to your next appointment in Urology    Thank you! ~ Twyla Willett               Time of day? Drinks    What   How  Kind?   Much? Urine    How much? Leaks  ([place] one)    Sm. Med.  Lg. Did you feel a strong urge?  ([place] one) What were you doing at the time?   Day one:    ? ? ?  Yes  No        ? ? ?  Yes  No        ? ? ?  Yes  No        ? ? ?  Yes  No        ? ? ?  Yes  No        ? ? ?  Yes  No        ? ? ?  Yes  No        ? ? ?  Yes  No        ? ? ?  Yes  No        ? ? ?  Yes  No        ? ? ?  Yes  No        ? ? ?  Yes  No        ? ? ?  Yes  No        ? ? ? Yes No        ? ? ? Yes No        ? ? ? Yes No        ? ? ?  Yes  No        ? ? ?  Yes  No        ? ? ?  Yes  No        ? ? ? "  Yes  No            Time of day? Drinks    What   How  Kind?   Much Urine    How much Leaks  ([place] one)    Sm. Med.  Lg. Did you feel a strong urge?  ([place] one) What were you doing at the time?       ? ? ?  Yes  No        ? ? ?  Yes  No        ? ? ?  Yes  No    Day two:    ? ? ?  Yes  No        ? ? ?  Yes  No        ? ? ?  Yes  No        ? ? ?  Yes  No        ? ? ?  Yes  No        ? ? ?  Yes  No        ? ? ?  Yes  No        ? ? ?  Yes  No        ? ? ?  Yes  No        ? ? ?  Yes  No        ? ? ?  Yes  No        ? ? ?  Yes  No        ? ? ?  Yes  No        ? ? ?  Yes  No        ? ? ?  Yes  No        ? ? ?  Yes  No        ? ? ? Yes No        ? ? ? Yes No        ? ? ? Yes No        ? ? ?  Yes  No        ? ? ?  Yes  No      '

## 2022-03-03 NOTE — LETTER
"3/3/2022       RE: Sudarshan Alonzo  [address]  [facility] [address]     Dear Colleague,    Thank you for referring your patient, Sudarshan Alonzo, to the [facility] [place] [facility]. Please see a copy of my visit note below.    It was my pleasure to meet Mr. Sudarshan Alonzo, a 26 year old year old male seen in consultation today for chief complaint: Consult (frequency)    Today he is unaccompanied     HPI: Mr. Sudarshan Alonzo is a 26M with PMH significant for anxiety and depression who has also had mixed urinary symptoms since the end of college , ~5 years ago.  His brother and his father are urologists, both [place]of[place].  His brother has offered informal advice and given him a trial of alfuzosin, but Sudarshan has never had a formal urology visit.     - Taking alfuzosin - without it, he really needs to push.  He once strained so hard that he fainted.  Now that he is on alfuzosin it has really helped.  But even on alfuzosin, his stream is weak, although he doesn't need to push quite as hard.    - Drinks a ton of water (more than 4L) and drinks 20-40 oz of caffeine per day, but still feels like he has terrible frequency even despite this.  Plus, he has always been a big water drinker. Nocturia x 12, the other night. Also has urgency if he drinks a lot of water quickly - feels sensation of needing to void at the tip of the penis. Never has had incontinence.    - Started Vesicare 10mg this week (from his brother).  Feels like it is helping.  Now gets up three times per night.    - Void frequently because he develops bladder discomfort. Discomfort resolves after he voids.  Never any pain   - Sometimes has dysuria - once per month - when he pees thick cloudy liquid that looks like ejaculate.    - AUA SS today 26 (2,5,3,4,5,3,5) \"unhappy\"  - No hematuria since a single episode when he was a child.   He became very dehydrated while " playing tennis and thinks hematuria was caused by transient renal dysfunction.     - Unsure of cause for these symptoms.  His brother things an STD may have caused some scarring in the urethra.  Sudarshan has never tested positive for an STD.  Did once have a partner with HPV.    - Is athletic but never recalls any  injury  - He tested himself for DM - negative.   - On Zoloft --> impacts sexual function.  Has a girlfriend, uses Cialis to counter effects of Zoloft.  Using 20mg.    - Bowels: sometimes blood from the rectum (on the stool or dripping) - has had this on and off for a year.  Has upcoming colonoscopy scheduled to evaluate this.      LABS:  2/23/22 - UA - negative  2/23/22 - GC/CT - negative  2/23/22 - treponema - negative  2/23/22 - glucose - 89mg/dL    Past Medical History:   Diagnosis Date     Anxiety      Bipolar disorder (H)      Depressive disorder      Gastroesophageal reflux disease        No past surgical history on file.    FAMILY HISTORY: Denies family history of urologic cancer.  HTN  No malignancies.      SOCIAL HISTORY: Works as [employer]  Sometimes used dip in the past.     reports that he quit smoking about 2 years ago. His smoking use included cigars, hookah, and dip, chew, snus or snuff. He started smoking about 10 years ago. He smoked 0.00 packs per day for 5.00 years. He quit smokeless tobacco use about 2 years ago.    Current Outpatient Medications   Medication Sig Dispense Refill     alfuzosin ER (UROXATRAL) 10 MG 24 hr tablet Take 1 tablet by mouth daily       atomoxetine (STRATTERA) 100 MG capsule Take 100 mg by mouth daily       buPROPion (WELLBUTRIN XL) 150 MG 24 hr tablet Take 150 mg by mouth daily       sertraline (ZOLOFT) 100 MG tablet 200 mg daily  0     solifenacin (VESICARE) 10 MG tablet Take 10 mg by mouth daily       tadalafil (CIALIS) 10 MG tablet Take 10 mg by mouth       tadalafil (CIALIS) 20 MG tablet Take 20 mg by mouth daily         ALLERGIES: Patient has no  known allergies.      REVIEW OF SYSTEMS:  As above in HPI    GENERAL PHYSICAL EXAM:   Vitals: /75   Pulse 94   There is no height or weight on file to calculate BMI.    GENERAL: Well groomed, well developed, well nourished male in NAD.  GI: Soft, NT, ND, no palpable masses.  No  CVAT bilaterally.  MS: Gait normal, normal muscle tone  SKIN: Warm to touch, dry.    HEMATOLOGIC/LYMPHATIC/IMMUNOLOGIC: No LE edema.  NEURO: Alert and oriented x 3.  PSYCH: Normal mood and affect, pleasant and agreeable during interview and exam.     :      Inguinal: no hernias or palpable lymph nodes      circumcised penis, no penile plaques or lesions. Has a raised mottled nontender mole (since birth) right shaft.       Orthotopic location of the urethral meatus.       Scrotum normal - tiny lesion anterior scrotal skin - fleshcolored, nontender      Testicles of normal firmness and consistency, no masses.      Epididymes bilaterally without masses or tenderness.         Vas and cord normal bilaterally.    HAILEY:     Deferred - patient has upcoming colonoscopy    UROFLOW:  Flow was completely interrupted with short bursts of emptying that occured only with straining.  Qmax was 14.2mL/s with average flow of 3.9mL/s. Voids 181cc with PVR 110cc,  PVR: Residual urine by ultrasound was 100-120 ml.      LABS: The last test results for Ms. Sudarshan Alonzo were reviewed.   PSA - No results found for: PSA   BMP -   Recent Labs   Lab Test 02/23/22  1452      POTASSIUM 3.9   CHLORIDE 102   CO2 29   BUN 15   CR 1.01   GLC 89   DARNELL 10.0       CBC -   Recent Labs   Lab Test 02/23/22  1452   WBC 8.1   HGB 15.5          ASSESSMENT:   1) Mr. Alonzo is a 26M PMH significant for anxiety and depression (on Zoloft, wellbutrin, strattera) who presents for 5 Yr Hx of mixed LUTS, incomplete bladder emptying (-120cc) and polydipsia (drinking >4L fluid per day)    PLAN:   - Your urinalysis is normal (from 2/23).  Your gonorrhea and  chlamydia cultures are normal (from 2/23)  - Today we will send ureaplasma and mycoplasma cultures  - Uroflow and postvoid residual (to make sure you are emptying).  Uroflow shows Qave of 3.9mL/s with stop-and-start flow suggestive of massive straining.  He doesn't empty fully (-120cc).  Discussed that etiology for this could be obstructive (most likely d/t stricture or pelvic floor dysfunction) or bladder hypotonia (unlikely given patient has no risk factors).    - Schedule voiding cystourethrogram and retrograde urethrogram - to rule out a urethral stricture (scar tissue in the urethra)   - Bladder diary (x 24 hours) - to figure out how much you drink and how your bladder behaves  - Continue Vesicare (solifenacin) 10mg daily - it is helping  - Continue alfuzosin 10mg daily - it is helping  - Discussed that caffeine can worsen symptoms  - Agree with colonoscopy  - Agree with dermatologist (take a look at the penile lesions)   - Return 6 weeks after completing these tests.     VISIT DURATION: 32 minutes (2:46 - 3:03 + 15 minutes on DOS)     Twyla Willett PA-C  Department of Urologic Surgery

## 2022-03-03 NOTE — NURSING NOTE
Chief Complaint   Patient presents with     Consult     frequency       Blood pressure 130/75, pulse 94. There is no height or weight on file to calculate BMI.    There is no problem list on file for this patient.      No Known Allergies    Current Outpatient Medications   Medication Sig Dispense Refill     alfuzosin ER (UROXATRAL) 10 MG 24 hr tablet Take 1 tablet by mouth daily       atomoxetine (STRATTERA) 100 MG capsule Take 100 mg by mouth daily       buPROPion (WELLBUTRIN XL) 150 MG 24 hr tablet Take 150 mg by mouth daily       sertraline (ZOLOFT) 100 MG tablet 200 mg daily  0     solifenacin (VESICARE) 10 MG tablet Take 10 mg by mouth daily       tadalafil (CIALIS) 10 MG tablet Take 10 mg by mouth       tadalafil (CIALIS) 20 MG tablet Take 20 mg by mouth daily         Social History     Tobacco Use     Smoking status: Former Smoker     Packs/day: 0.00     Years: 5.00     Pack years: 0.00     Types: Cigars, Hookah, Dip, chew, snus or snuff     Start date: 2011     Quit date: 2019     Years since quittin.6     Smokeless tobacco: Former User     Quit date: 2019   Substance Use Topics     Alcohol use: Not Currently     Drug use: Not Currently     Types: Cocaine, Marijuana, Benzodiazepines       Rico Wayne  3/3/2022  2:42 PM

## 2022-03-03 NOTE — LETTER
March 10, 2022      Sudarshan Alonzo  [address]  [facility] [address]        Dear Sudarshan,    We are writing to inform you of your test results.    Your ureaplasma culture is preliminarily positive.  I have asked my nurse to reach out to you by telephone to arrange treatment for this.  We will use a 7 day course of doxycycline.      Resulted Orders   Ureaplasma and Mycoplasma Culture   Result Value Ref Range    Culture Culture in progress     Culture Ureaplasma species (A)        If you have any questions or concerns, please call the clinic at the number listed below.       Sincerely,        [facility]  [phone]

## 2022-03-03 NOTE — PROGRESS NOTES
"It was my pleasure to meet Mr. Sudarshan Alonzo, a 26 year old year old male seen in consultation today for chief complaint: Consult (frequency)    Today he is unaccompanied     HPI: Mr. Sudarshan Alonzo is a 26M with PMH significant for anxiety and depression who has also had mixed urinary symptoms since the end of college , ~5 years ago.  His brother and his father are urologists, both [place]of[place].  His brother has offered informal advice and given him a trial of alfuzosin, but Sudarshan has never had a formal urology visit.     - Taking alfuzosin - without it, he really needs to push.  He once strained so hard that he fainted.  Now that he is on alfuzosin it has really helped.  But even on alfuzosin, his stream is weak, although he doesn't need to push quite as hard.    - Drinks a ton of water (more than 4L) and drinks 20-40 oz of caffeine per day, but still feels like he has terrible frequency even despite this.  Plus, he has always been a big water drinker. Nocturia x 12, the other night. Also has urgency if he drinks a lot of water quickly - feels sensation of needing to void at the tip of the penis. Never has had incontinence.    - Started Vesicare 10mg this week (from his brother).  Feels like it is helping.  Now gets up three times per night.    - Void frequently because he develops bladder discomfort. Discomfort resolves after he voids.  Never any pain   - Sometimes has dysuria - once per month - when he pees thick cloudy liquid that looks like ejaculate.    - AUA SS today 26 (2,5,3,4,5,3,5) \"unhappy\"  - No hematuria since a single episode when he was a child.   He became very dehydrated while playing tennis and thinks hematuria was caused by transient renal dysfunction.     - Unsure of cause for these symptoms.  His brother things an STD may have caused some scarring in the urethra.  Sudarshan has never tested positive for an STD.  Did once have a partner with HPV.    - Is athletic but never recalls any  " injury  - He tested himself for DM - negative.   - On Zoloft --> impacts sexual function.  Has a girlfriend, uses Cialis to counter effects of Zoloft.  Using 20mg.    - Bowels: sometimes blood from the rectum (on the stool or dripping) - has had this on and off for a year.  Has upcoming colonoscopy scheduled to evaluate this.      LABS:  2/23/22 - UA - negative  2/23/22 - GC/CT - negative  2/23/22 - treponema - negative  2/23/22 - glucose - 89mg/dL    Past Medical History:   Diagnosis Date     Anxiety      Bipolar disorder (H)      Depressive disorder      Gastroesophageal reflux disease        No past surgical history on file.    FAMILY HISTORY: Denies family history of urologic cancer.  HTN  No malignancies.      SOCIAL HISTORY: Works as [employer]  Sometimes used dip in the past.     reports that he quit smoking about 2 years ago. His smoking use included cigars, hookah, and dip, chew, snus or snuff. He started smoking about 10 years ago. He smoked 0.00 packs per day for 5.00 years. He quit smokeless tobacco use about 2 years ago.    Current Outpatient Medications   Medication Sig Dispense Refill     alfuzosin ER (UROXATRAL) 10 MG 24 hr tablet Take 1 tablet by mouth daily       atomoxetine (STRATTERA) 100 MG capsule Take 100 mg by mouth daily       buPROPion (WELLBUTRIN XL) 150 MG 24 hr tablet Take 150 mg by mouth daily       sertraline (ZOLOFT) 100 MG tablet 200 mg daily  0     solifenacin (VESICARE) 10 MG tablet Take 10 mg by mouth daily       tadalafil (CIALIS) 10 MG tablet Take 10 mg by mouth       tadalafil (CIALIS) 20 MG tablet Take 20 mg by mouth daily         ALLERGIES: Patient has no known allergies.      REVIEW OF SYSTEMS:  As above in HPI    GENERAL PHYSICAL EXAM:   Vitals: /75   Pulse 94   There is no height or weight on file to calculate BMI.    GENERAL: Well groomed, well developed, well nourished male in NAD.  GI: Soft, NT, ND, no palpable masses.  No  CVAT bilaterally.  MS: Gait  normal, normal muscle tone  SKIN: Warm to touch, dry.    HEMATOLOGIC/LYMPHATIC/IMMUNOLOGIC: No LE edema.  NEURO: Alert and oriented x 3.  PSYCH: Normal mood and affect, pleasant and agreeable during interview and exam.     :      Inguinal: no hernias or palpable lymph nodes      circumcised penis, no penile plaques or lesions. Has a raised mottled nontender mole (since birth) right shaft.       Orthotopic location of the urethral meatus.       Scrotum normal - tiny lesion anterior scrotal skin - fleshcolored, nontender      Testicles of normal firmness and consistency, no masses.      Epididymes bilaterally without masses or tenderness.         Vas and cord normal bilaterally.    HAILEY:     Deferred - patient has upcoming colonoscopy    UROFLOW:  Flow was completely interrupted with short bursts of emptying that occured only with straining.  Qmax was 14.2mL/s with average flow of 3.9mL/s. Voids 181cc with PVR 110cc,  PVR: Residual urine by ultrasound was 100-120 ml.      LABS: The last test results for Ms. Sudarshan Alonzo were reviewed.   PSA - No results found for: PSA   BMP -   Recent Labs   Lab Test 02/23/22  1452      POTASSIUM 3.9   CHLORIDE 102   CO2 29   BUN 15   CR 1.01   GLC 89   DARNELL 10.0       CBC -   Recent Labs   Lab Test 02/23/22  1452   WBC 8.1   HGB 15.5          ASSESSMENT:   1) Mr. Alonzo is a 26M PMH significant for anxiety and depression (on Zoloft, wellbutrin, strattera) who presents for 5 Yr Hx of mixed LUTS, incomplete bladder emptying (-120cc) and polydipsia (drinking >4L fluid per day)    PLAN:   - Your urinalysis is normal (from 2/23).  Your gonorrhea and chlamydia cultures are normal (from 2/23)  - Today we will send ureaplasma and mycoplasma cultures  - Uroflow and postvoid residual (to make sure you are emptying).  Uroflow shows Qave of 3.9mL/s with stop-and-start flow suggestive of massive straining.  He doesn't empty fully (-120cc).  Discussed that  etiology for this could be obstructive (most likely d/t stricture or pelvic floor dysfunction) or bladder hypotonia (unlikely given patient has no risk factors).    - Schedule voiding cystourethrogram and retrograde urethrogram - to rule out a urethral stricture (scar tissue in the urethra)   - Bladder diary (x 24 hours) - to figure out how much you drink and how your bladder behaves  - Continue Vesicare (solifenacin) 10mg daily - it is helping  - Continue alfuzosin 10mg daily - it is helping  - Discussed that caffeine can worsen symptoms  - Agree with colonoscopy  - Agree with dermatologist (take a look at the penile lesions)   - Return 6 weeks after completing these tests.     VISIT DURATION: 32 minutes (2:46 - 3:03 + 15 minutes on DOS)     Twyla Willett PA-C  Department of Urologic Surgery

## 2022-03-10 ENCOUNTER — PATIENT OUTREACH (OUTPATIENT)
Dept: UROLOGY | Facility: CLINIC | Age: 27
End: 2022-03-10
Payer: COMMERCIAL

## 2022-03-10 DIAGNOSIS — R39.15 URINARY URGENCY: Primary | ICD-10-CM

## 2022-03-10 RX ORDER — DOXYCYCLINE 100 MG/1
100 CAPSULE ORAL 2 TIMES DAILY
Qty: 14 CAPSULE | Refills: 0 | Status: SHIPPED | OUTPATIENT
Start: 2022-03-10 | End: 2022-03-17

## 2022-03-10 NOTE — TELEPHONE ENCOUNTER
Reached out to pt on behalf of Twyla to inform of positive ureaplasma and need for antibiotics. Informed pt that antibiotics will be sent to preferred pharmacy. Also instructed pt that partner will also need to be tested/treated; pt understood.     Pt mentioned the plan provided by Twyla regarding cystourethrogram and retrograde urethrogram. Pt stated that he has two family members that are urologists, one being his father. Pt indicates that his father has only performed this test once in 20 years and pt does not want to proceed with this. Pt states he would like to have a male urology consult placed.    Writer spoke with Twyla to verify with pt for ongoing plan. Will continue to follow as needed.

## 2022-03-11 LAB — BACTERIA UR CULT: ABNORMAL

## 2022-03-21 ENCOUNTER — DOCUMENTATION ONLY (OUTPATIENT)
Dept: UROLOGY | Facility: CLINIC | Age: 27
End: 2022-03-21
Payer: COMMERCIAL

## 2022-03-21 NOTE — PROGRESS NOTES
"TELEPHONE CALL:  I received word from my team that Mr. Alonzo wanted to discuss the urology plan.  I therefore gave him a call to discuss.     He did answer his phone.  He told me he had discussed the urology plan with his dad and brother (both who are urologists), and they told him that they couldn't believe I was starting the workup with a urethrogram and RUG \"because no one does those anymore.\"     I explained to him that his stream was slow and uroflow suggested massive straining, he didn't empty his bladder fully, and he was concerned about obstructive symptoms.  Urethral stricture was at the top of my differential diagnosis.  I explained that at the  we have several excellent urethral stricture surgeons, and they find these imaging studies to be instrumental in making /understanding a stricture diagnosis.      I asked him what his relatives recommended instead.  He said:  - they thought \"maybe you should stop the Vesicare\".  I mentioned to him that 1) his brother had been the one to start the Vesicare. 2) Sudarshan thought the Vesicare was helping his symptoms and 3) the symptoms had preceded starting the Vesicare.   - they also thought \"you should get a sleep study\" (for the urinary symptoms).  I explained that undiagnosed sleep apnea can be a contributing factor for nocturia.  But he is young and fit, has no history of snoring, and has both daytime and nighttime irritative symptoms as well as significant obstructive symptoms.  I didn't think a sleep study was the best first step in his evaluation.     Sudarshan wasn't able to relay any other specific ideas that his brother/father had.      At conclusion, I stated that I would be happy to work with him in the future but continue to think the RUG VCUG would be useful next steps.  I also offered for him to get a second opinion.     [facility]  "

## 2022-03-31 ENCOUNTER — TELEPHONE (OUTPATIENT)
Dept: GASTROENTEROLOGY | Facility: CLINIC | Age: 27
End: 2022-03-31

## 2022-03-31 NOTE — TELEPHONE ENCOUNTER
Patient scheduled for colonoscopy on 4/6/22.     Covid test scheduled: 4/2/22    Arrival time: 0925    Facility location: [facility]    Sedation type: CS    Indication for procedure: rectal bleeding    Anticoagulations? no     Bowel prep recommendation: Miralax/Magnesium citrate/Dulcolax     Pre visit planning completed.    Twyla Ny RN

## 2022-03-31 NOTE — TELEPHONE ENCOUNTER
Pre assessment questions completed for upcoming colonoscopy procedure scheduled on 4/6/22    COVID test scheduled 4/2/22    Reviewed procedural arrival time 0925 and facility location [facility].    Designated  policy reviewed. Instructed to have someone stay 6 hours post procedure.     Reviewed Miralax prep instructions with patient. No fiber/iron supplements or foods that contain nuts/seeds 7 days prior to procedure.     Patient verbalized understanding and had no questions or concerns at this time.    Twyla Ny RN

## 2022-04-05 ENCOUNTER — ANESTHESIA EVENT (OUTPATIENT)
Dept: SURGERY | Facility: AMBULATORY SURGERY CENTER | Age: 27
End: 2022-04-05
Payer: COMMERCIAL

## 2022-04-06 ENCOUNTER — ANESTHESIA (OUTPATIENT)
Dept: SURGERY | Facility: AMBULATORY SURGERY CENTER | Age: 27
End: 2022-04-06
Payer: COMMERCIAL

## 2022-04-06 ENCOUNTER — HOSPITAL ENCOUNTER (OUTPATIENT)
Facility: AMBULATORY SURGERY CENTER | Age: 27
Discharge: HOME | End: 2022-04-06
Attending: INTERNAL MEDICINE
Payer: COMMERCIAL

## 2022-04-06 VITALS
DIASTOLIC BLOOD PRESSURE: 70 MMHG | TEMPERATURE: 97.8 F | RESPIRATION RATE: 14 BRPM | OXYGEN SATURATION: 99 % | HEIGHT: 74 IN | WEIGHT: 223 LBS | SYSTOLIC BLOOD PRESSURE: 112 MMHG | HEART RATE: 63 BPM | BODY MASS INDEX: 28.62 KG/M2

## 2022-04-06 VITALS — HEART RATE: 58 BPM

## 2022-04-06 LAB — COLONOSCOPY: NORMAL

## 2022-04-06 PROCEDURE — 88305 TISSUE EXAM BY PATHOLOGIST: CPT | Mod: 26 | Performed by: PATHOLOGY

## 2022-04-06 PROCEDURE — 45380 COLONOSCOPY AND BIOPSY: CPT

## 2022-04-06 PROCEDURE — 88305 TISSUE EXAM BY PATHOLOGIST: CPT | Mod: TC | Performed by: INTERNAL MEDICINE

## 2022-04-06 RX ORDER — PROPOFOL 10 MG/ML
INJECTION, EMULSION INTRAVENOUS CONTINUOUS PRN
Status: DISCONTINUED | OUTPATIENT
Start: 2022-04-06 | End: 2022-04-06

## 2022-04-06 RX ORDER — ONDANSETRON 2 MG/ML
4 INJECTION INTRAMUSCULAR; INTRAVENOUS
Status: DISCONTINUED | OUTPATIENT
Start: 2022-04-06 | End: 2022-04-07 | Stop reason: HOSPADM

## 2022-04-06 RX ORDER — LIDOCAINE 40 MG/G
CREAM TOPICAL
Status: DISCONTINUED | OUTPATIENT
Start: 2022-04-06 | End: 2022-04-07 | Stop reason: HOSPADM

## 2022-04-06 RX ORDER — ONDANSETRON 2 MG/ML
4 INJECTION INTRAMUSCULAR; INTRAVENOUS EVERY 30 MIN PRN
Status: DISCONTINUED | OUTPATIENT
Start: 2022-04-06 | End: 2022-04-07 | Stop reason: HOSPADM

## 2022-04-06 RX ORDER — SODIUM CHLORIDE, SODIUM LACTATE, POTASSIUM CHLORIDE, CALCIUM CHLORIDE 600; 310; 30; 20 MG/100ML; MG/100ML; MG/100ML; MG/100ML
INJECTION, SOLUTION INTRAVENOUS CONTINUOUS
Status: DISCONTINUED | OUTPATIENT
Start: 2022-04-06 | End: 2022-04-07 | Stop reason: HOSPADM

## 2022-04-06 RX ORDER — MEPERIDINE HYDROCHLORIDE 25 MG/ML
12.5 INJECTION INTRAMUSCULAR; INTRAVENOUS; SUBCUTANEOUS
Status: DISCONTINUED | OUTPATIENT
Start: 2022-04-06 | End: 2022-04-07 | Stop reason: HOSPADM

## 2022-04-06 RX ORDER — PROPOFOL 10 MG/ML
INJECTION, EMULSION INTRAVENOUS PRN
Status: DISCONTINUED | OUTPATIENT
Start: 2022-04-06 | End: 2022-04-06

## 2022-04-06 RX ORDER — SIMETHICONE
LIQUID (ML) MISCELLANEOUS PRN
Status: DISCONTINUED | OUTPATIENT
Start: 2022-04-06 | End: 2022-04-06 | Stop reason: HOSPADM

## 2022-04-06 RX ORDER — LIDOCAINE HYDROCHLORIDE 20 MG/ML
INJECTION, SOLUTION INFILTRATION; PERINEURAL PRN
Status: DISCONTINUED | OUTPATIENT
Start: 2022-04-06 | End: 2022-04-06

## 2022-04-06 RX ORDER — ONDANSETRON 4 MG/1
4 TABLET, ORALLY DISINTEGRATING ORAL EVERY 30 MIN PRN
Status: DISCONTINUED | OUTPATIENT
Start: 2022-04-06 | End: 2022-04-07 | Stop reason: HOSPADM

## 2022-04-06 RX ADMIN — SODIUM CHLORIDE, SODIUM LACTATE, POTASSIUM CHLORIDE, CALCIUM CHLORIDE: 600; 310; 30; 20 INJECTION, SOLUTION INTRAVENOUS at 09:58

## 2022-04-06 RX ADMIN — PROPOFOL 50 MG: 10 INJECTION, EMULSION INTRAVENOUS at 10:29

## 2022-04-06 RX ADMIN — PROPOFOL 50 MG: 10 INJECTION, EMULSION INTRAVENOUS at 10:32

## 2022-04-06 RX ADMIN — LIDOCAINE HYDROCHLORIDE 50 MG: 20 INJECTION, SOLUTION INFILTRATION; PERINEURAL at 10:29

## 2022-04-06 RX ADMIN — PROPOFOL 150 MCG/KG/MIN: 10 INJECTION, EMULSION INTRAVENOUS at 10:29

## 2022-04-06 ASSESSMENT — LIFESTYLE VARIABLES: TOBACCO_USE: 1

## 2022-04-06 ASSESSMENT — COPD QUESTIONNAIRES: COPD: 0

## 2022-04-06 ASSESSMENT — ENCOUNTER SYMPTOMS: ORTHOPNEA: 0

## 2022-04-06 NOTE — ANESTHESIA POSTPROCEDURE EVALUATION
Patient: Sudarshan Alonzo    Procedure: Procedure(s):  COLONOSCOPY, WITH BIOPSY       Anesthesia Type:  MAC    Note:  Disposition: Outpatient   Postop Pain Control: Uneventful            Sign Out: Well controlled pain   PONV: No   Neuro/Psych: Uneventful            Sign Out: Acceptable/Baseline neuro status   Airway/Respiratory: Uneventful            Sign Out: Acceptable/Baseline resp. status   CV/Hemodynamics: Uneventful            Sign Out: Acceptable CV status; No obvious hypovolemia; No obvious fluid overload   Other NRE: NONE   DID A NON-ROUTINE EVENT OCCUR? No           Last vitals:  Vitals Value Taken Time   /70 04/06/22 1121   Temp 36.6  C (97.8  F) 04/06/22 1121   Pulse     Resp 14 04/06/22 1121   SpO2 99 % 04/06/22 1121       Electronically Signed By: Ayla Conn MD  April 6, 2022  11:34 AM

## 2022-04-06 NOTE — ANESTHESIA PREPROCEDURE EVALUATION
Anesthesia Pre-Procedure Evaluation    Patient: Sudarshan Alonzo   MRN: [id number] : 1995        Procedure : Procedure(s):  COLONOSCOPY          Past Medical History:   Diagnosis Date     Anxiety      Bipolar disorder (H)      Depressive disorder      Gastroesophageal reflux disease       History reviewed. No pertinent surgical history.   No Known Allergies   Social History     Tobacco Use     Smoking status: Former Smoker     Packs/day: 0.00     Years: 5.00     Pack years: 0.00     Types: Cigars, Hookah, Dip, chew, snus or snuff     Start date: 2011     Quit date: 2019     Years since quittin.7     Smokeless tobacco: Former User     Quit date: 2019   Substance Use Topics     Alcohol use: Not Currently      Wt Readings from Last 1 Encounters:   22 101.2 kg (223 lb)        Anesthesia Evaluation   Pt has not had prior anesthetic         ROS/MED HX  ENT/Pulmonary:     (+) tobacco use, Past use,  (-) asthma, COPD and recent URI   Neurologic:       Cardiovascular:    (-) RAMIREZ, orthopnea/PND and syncope   METS/Exercise Tolerance: >4 METS    Hematologic:       Musculoskeletal:       GI/Hepatic:     (+) GERD, Asymptomatic on medication,     Renal/Genitourinary:       Endo:       Psychiatric/Substance Use:     (+) psychiatric history (ADHD)     Infectious Disease:       Malignancy:       Other:            Physical Exam    Airway        Mallampati: I   TM distance: > 3 FB   Neck ROM: full   Mouth opening: > 3 cm    Respiratory Devices and Support         Dental  no notable dental history         Cardiovascular          Rhythm and rate: regular and normal     Pulmonary           breath sounds clear to auscultation           OUTSIDE LABS:  CBC:   Lab Results   Component Value Date    WBC 8.1 2022    HGB 15.5 2022    HCT 47.4 2022     2022     BMP:   Lab Results   Component Value Date     2022    POTASSIUM 3.9 2022    CHLORIDE 102 2022    CO2  29 02/23/2022    BUN 15 02/23/2022    CR 1.01 02/23/2022    GLC 89 02/23/2022     COAGS:   Lab Results   Component Value Date    INR 1.04 02/23/2022     POC: No results found for: BGM, HCG, HCGS  HEPATIC: No results found for: ALBUMIN, PROTTOTAL, ALT, AST, GGT, ALKPHOS, BILITOTAL, BILIDIRECT, DEDRICK  OTHER:   Lab Results   Component Value Date    DARNELL 10.0 02/23/2022    TSH 2.56 02/23/2022       Anesthesia Plan    ASA Status:  1   NPO Status:  NPO Appropriate    Anesthesia Type: MAC.     - Reason for MAC: straight local not clinically adequate              Consents    Anesthesia Plan(s) and associated risks, benefits, and realistic alternatives discussed. Questions answered and patient/representative(s) expressed understanding.    - Discussed:     - Discussed with:  Patient         Postoperative Care            Comments:    Other Comments: Discussed plan for MAC, including possibility of awareness, risk of aspiration pneumonia, risk of hypoxia/low oxygen/airway obstruction requiring additional airway support/devices. Discussed alternatives. Discussed backup plan of general anesthesia and risks of general anesthesia, including sore throat/hoarse voice, abrasions/damage to lips/tongue/teeth, nausea, rare complications (including medication reactions, cardiac, pulmonary, recall). Ensured understanding, invited questions and all questions were answered.       H&P reviewed: Unable to attach H&P to encounter due to EHR limitations. H&P Update: appropriate H&P reviewed, patient examined. No interval changes since H&P (within 30 days).         Ayla Conn MD

## 2022-04-06 NOTE — ANESTHESIA CARE TRANSFER NOTE
Patient: Sudarshan Alonzo    Procedure: Procedure(s):  COLONOSCOPY, WITH BIOPSY       Diagnosis: Rectal bleeding [K62.5]  Diagnosis Additional Information: No value filed.    Anesthesia Type:   MAC     Note:    Oropharynx: oropharynx clear of all foreign objects  Level of Consciousness: awake  Oxygen Supplementation: room air    Independent Airway: airway patency satisfactory and stable  Dentition: dentition unchanged  Vital Signs Stable: post-procedure vital signs reviewed and stable  Report to RN Given: handoff report given  Patient transferred to: Phase II    Handoff Report: Identifed the Patient, Identified the Reponsible Provider, Reviewed the pertinent medical history, Discussed the surgical course, Reviewed Intra-OP anesthesia mangement and issues during anesthesia, Set expectations for post-procedure period and Allowed opportunity for questions and acknowledgement of understanding      Vitals:  Vitals Value Taken Time   BP     Temp     Pulse     Resp     SpO2         Electronically Signed By: ROJELIO Nam CRNA  April 6, 2022  11:05 AM

## 2022-04-06 NOTE — H&P
"Sudarshan HURST [place]  [id number]  male  26 year old      Reason for procedure/surgery: Rectal bleeding    There is no problem list on file for this patient.      Past Surgical History:  History reviewed. No pertinent surgical history.    Past Medical History:   Past Medical History:   Diagnosis Date     Anxiety      Bipolar disorder (H)      Depressive disorder      Gastroesophageal reflux disease        Social History:   Social History     Tobacco Use     Smoking status: Former Smoker     Packs/day: 0.00     Years: 5.00     Pack years: 0.00     Types: Cigars, Hookah, Dip, chew, snus or snuff     Start date: 2011     Quit date: 2019     Years since quittin.7     Smokeless tobacco: Former User     Quit date: 2019   Substance Use Topics     Alcohol use: Not Currently       Family History:   Family History   Problem Relation Age of Onset     Mental Illness Mother        Allergies: No Known Allergies    Active Medications:   Current Outpatient Medications   Medication Sig Dispense Refill     alfuzosin ER (UROXATRAL) 10 MG 24 hr tablet Take 1 tablet by mouth daily       atomoxetine (STRATTERA) 100 MG capsule Take 100 mg by mouth daily       buPROPion (WELLBUTRIN XL) 150 MG 24 hr tablet Take 150 mg by mouth daily       sertraline (ZOLOFT) 100 MG tablet 200 mg daily  0     solifenacin (VESICARE) 10 MG tablet Take 10 mg by mouth daily       tadalafil (CIALIS) 10 MG tablet Take 10 mg by mouth       tadalafil (CIALIS) 20 MG tablet Take 20 mg by mouth daily         Systemic Review:   CONSTITUTIONAL: NEGATIVE for fever, chills, change in weight  ENT/MOUTH: NEGATIVE for ear, mouth and throat problems  RESP: NEGATIVE for significant cough or SOB  CV: NEGATIVE for chest pain, palpitations or peripheral edema    Physical Examination:   Vital Signs: /62   Pulse 63   Temp 97.3  F (36.3  C) (Skin)   Resp 18   Ht 1.867 m (6' 1.5\")   Wt 101.2 kg (223 lb)   SpO2 97%   BMI 29.02 kg/m    GENERAL: healthy, alert " and no distress  NECK: no adenopathy, no asymmetry, masses, or scars  RESP: lungs clear to auscultation - no rales, rhonchi or wheezes  CV: regular rate and rhythm, normal S1 S2, no S3 or S4, no murmur, click or rub, no peripheral edema and peripheral pulses strong  ABDOMEN: soft, nontender, no hepatosplenomegaly, no masses and bowel sounds normal  MS: no gross musculoskeletal defects noted, no edema    Plan: Appropriate to proceed as scheduled.      Augustin Christy MD  4/6/2022    PCP:  Elio Veliz

## 2022-04-07 LAB
PATH REPORT.COMMENTS IMP SPEC: NORMAL
PATH REPORT.COMMENTS IMP SPEC: NORMAL
PATH REPORT.FINAL DX SPEC: NORMAL
PATH REPORT.GROSS SPEC: NORMAL
PATH REPORT.MICROSCOPIC SPEC OTHER STN: NORMAL
PATH REPORT.RELEVANT HX SPEC: NORMAL
PHOTO IMAGE: NORMAL

## 2022-05-12 ENCOUNTER — OFFICE VISIT (OUTPATIENT)
Dept: FAMILY MEDICINE | Facility: CLINIC | Age: 27
End: 2022-05-12
Payer: COMMERCIAL

## 2022-05-12 VITALS — SYSTOLIC BLOOD PRESSURE: 126 MMHG | DIASTOLIC BLOOD PRESSURE: 74 MMHG

## 2022-05-12 DIAGNOSIS — L91.8 SKIN TAG: ICD-10-CM

## 2022-05-12 DIAGNOSIS — D18.01 CHERRY ANGIOMA: ICD-10-CM

## 2022-05-12 DIAGNOSIS — L64.9 ANDROGENETIC ALOPECIA: ICD-10-CM

## 2022-05-12 DIAGNOSIS — R21 RASH AND OTHER NONSPECIFIC SKIN ERUPTION: Primary | ICD-10-CM

## 2022-05-12 DIAGNOSIS — D22.9 BENIGN NEVUS: ICD-10-CM

## 2022-05-12 LAB
KOH PREPARATION: NORMAL
KOH PREPARATION: NORMAL

## 2022-05-12 PROCEDURE — 87220 TISSUE EXAM FOR FUNGI: CPT | Performed by: PHYSICIAN ASSISTANT

## 2022-05-12 PROCEDURE — 99203 OFFICE O/P NEW LOW 30 MIN: CPT | Performed by: PHYSICIAN ASSISTANT

## 2022-05-12 RX ORDER — TRIAMCINOLONE ACETONIDE 5 MG/G
CREAM TOPICAL 2 TIMES DAILY
Qty: 60 G | Refills: 0 | Status: SHIPPED | OUTPATIENT
Start: 2022-05-12

## 2022-05-12 RX ORDER — FINASTERIDE 1 MG/1
1 TABLET, FILM COATED ORAL DAILY
Qty: 90 TABLET | Refills: 1 | Status: SHIPPED | OUTPATIENT
Start: 2022-05-12

## 2022-05-12 NOTE — PATIENT INSTRUCTIONS
Cherry angioma  Skin tag  Rash-will let  you know results.       Androgenetic alopecia    Male pattern alopecia or androgenetic alopecia is mediated by dihydrotestosterone causing miniaturization of the hair follicles. Hair loss if first seen on the anterior scalp and bitemporal regions. The only clinically proven methods to prevent hair loss are Finasteride and Minoxidil. Neither Finasteride nor Minoxidil can cause hair to regrow. Once treatment is discontinued hair loss will progress.    Side effects of oral Finasteride include and are not limited to impotence, decreased libido, abnormal ejaculation, sexual dysfunction and gynecomastia.    Side effects of Minoxidil (Rogaine) include contact dermatitis, pruritis (itch), and skin irritation.

## 2022-05-12 NOTE — LETTER
5/12/2022         RE: Sudarshan Alonzo  [address]  [place] [address]        Dear Colleague,    Thank you for referring your patient, Sudarshan Alonzo, to the [facility]. Please see a copy of my visit note below.    [facility]  Encounter Date: May 12, 2022  Office Visit   ____________________________________________    Assessment & Plan:    1. Rash and other nonspecific skin eruption   tinea vs erythema annulare centrifugum vs dermatitis   Disc ddx    - KOH prep was obtained and sent to lab. Neg    Apply a thin layer of TAC to affected area 2x a day for 2 weeks. Tapering with improvement. Do not use on face or body folds.  Discussed side effects of topical steroids including but not limited to atrophy (skin thinning), striae (stretch marks) telangiectasias, steroid acne, and others. Do not apply to normal skin. Do not apply to discolored skin that does not have rash present. Educated patient on post inflammatory hyperpigmentation.   Moisturize skin daily    2. Cherry angiomas, forehead x2, benign nevus    Treatment of these lesions would be purely cosmetic and not medically neccessary.  Lesion may recur and/or may not completely resolve. May need additional treatment.  Different removal options including excision, cryotherapy, cautery and /or laser.      3. Suspected skin tag on the scrotum, less likely genital warts  Offered cryotherapy vs shave removal for diagnosis. Patient would like to monitor for the time being.     4. Androgenetic alopecia  Male pattern alopecia or androgenetic alopecia is mediated by dihydrotestosterone causing miniaturization of the hair follicles. Hair loss if first seen on the anterior scalp and bitemporal regions. The only clinically proven methods to prevent hair loss are Finasteride and Minoxidil. Neither Finasteride nor Minoxidil can cause hair to regrow. Once treatment is discontinued hair loss will progress.    Start  finasteride 1.25 mg daily. Side effects of oral Finasteride include and are not limited to impotence, decreased libido, abnormal ejaculation, sexual dysfunction and gynecomastia.    Start applying Rogaine 5% foam once daily. Side effects of Minoxidil (Rogaine) include contact dermatitis, pruritis (itch), and skin irritation.    Reviewed pertinent charts and labs prior to office visit.       Follow-up: 3-4 month(s) in-person, or earlier for new or changing lesions      Provider Disclosure:   The documentation recorded by the scribe accurately reflects the services I personally performed and the decisions made by me.    Twyla Gonzalez, MS, JOVANI      Scribe Disclosure:  I, Joy Ocasio, am serving as a scribe to document services personally performed by Twyla Gonzalez PA-C based on data collection and the provider's statements to me.   ____________________________________________________    HPI:  Mr. Sudarshan Alonzo is a(n) 26 year old male who presents today as a new patient for a few spots of concern. He reports a rash on his R arm. Patient states this has been present for a year.  Patient reports the following symptoms: itching occasionally.  Patient reports the following previous treatments: Lamisil for two weeks with some improvement.  Patient reports the following modifying factors: He does have toenail fungus.  Associated symptoms: none. He also points to a red dot on his forehead that is asymptomatic. He also points to a growth on his scrotum that has been present for 2-3 years. He also points to a large mole on his penis that has been present for his whole life. He also reports hair loss on the top of his head. His father is bald. Patient has no other skin complaints today.  Remainder of the HPI, Meds, PMH, Allergies, FH, and SH was reviewed in chart.           Medications:  Current Outpatient Medications   Medication     alfuzosin ER (UROXATRAL) 10 MG 24 hr tablet     atomoxetine (STRATTERA) 100  MG capsule     buPROPion (WELLBUTRIN XL) 150 MG 24 hr tablet     sertraline (ZOLOFT) 100 MG tablet     solifenacin (VESICARE) 10 MG tablet     tadalafil (CIALIS) 10 MG tablet     tadalafil (CIALIS) 20 MG tablet     No current facility-administered medications for this visit.        Past Medical History:   There is no problem list on file for this patient.    Past Medical History:   Diagnosis Date     Anxiety      Bipolar disorder (H)      Depressive disorder      Gastroesophageal reflux disease        Past Surgical History:   Past Surgical History:   Procedure Laterality Date     COLONOSCOPY N/A 2022    Procedure: COLONOSCOPY, WITH BIOPSY;  Surgeon: Augustin Christy MD;  [place] [facility] [place]       Family History:  Family History   Problem Relation Age of Onset     Mental Illness Mother        Social History:  Social History     Tobacco Use     Smoking status: Former Smoker     Packs/day: 0.00     Years: 5.00     Pack years: 0.00     Types: Cigars, Hookah, Dip, chew, snus or snuff     Start date: 2011     Quit date: 2019     Years since quittin.8     Smokeless tobacco: Former User     Quit date: 2019   Substance Use Topics     Alcohol use: Not Currently          Review Of Systems:  Skin: red, itchy rash  Eyes: negative  Ears/Nose/Throat: negative  Respiratory: No shortness of breath, dyspnea on exertion, cough, or hemoptysis  Cardiovascular: negative  Gastrointestinal: negative  Genitourinary: negative  Musculoskeletal: negative  Neurologic: negative  Psychiatric: negative  Hematologic/Lymphatic/Immunologic: negative  Endocrine: negative      Objective:     /74   Eyes: Conjunctivae/lids: Normal   ENT: Lips:  Normal  MSK: Normal  Cardiovascular: Peripheral edema none  Pulm: Breathing Normal  Neuro/Psych: Orientation: Normal; Mood/Affect: Normal, NAD, WDWN  Pt accompanied by: self  Following areas examined: Scalp, face (wearing mask), neck, R&L upper extremities, and groin/genitals.    Ayoub skin type: ii   Findings:  Pink scaly patches x 3 with central clearing and slightly raised borders with scale  Red smooth well-defined macules on forehead x2.   There is(are) skin colored pedunculated papule on the scrotum.   Well circumscribed macules with symmetric color on right penis   Thinning of hair on the frontal, mid, frontotemporal scalp.          CC No referring provider defined for this encounter. on close of this encounter.        Again, thank you for allowing me to participate in the care of your patient.        Sincerely,        Twyla Gonzalez PA-C

## 2022-05-12 NOTE — PROGRESS NOTES
[facility]  Encounter Date: May 12, 2022  Office Visit   ____________________________________________    Assessment & Plan:    1. Rash and other nonspecific skin eruption   tinea vs erythema annulare centrifugum vs dermatitis   Disc ddx    - KOH prep was obtained and sent to lab. Neg    Apply a thin layer of TAC to affected area 2x a day for 2 weeks. Tapering with improvement. Do not use on face or body folds.  Discussed side effects of topical steroids including but not limited to atrophy (skin thinning), striae (stretch marks) telangiectasias, steroid acne, and others. Do not apply to normal skin. Do not apply to discolored skin that does not have rash present. Educated patient on post inflammatory hyperpigmentation.   Moisturize skin daily    2. Cherry angiomas, forehead x2, benign nevus    Treatment of these lesions would be purely cosmetic and not medically neccessary.  Lesion may recur and/or may not completely resolve. May need additional treatment.  Different removal options including excision, cryotherapy, cautery and /or laser.      3. Suspected skin tag on the scrotum, less likely genital warts  Offered cryotherapy vs shave removal for diagnosis. Patient would like to monitor for the time being.     4. Androgenetic alopecia  Male pattern alopecia or androgenetic alopecia is mediated by dihydrotestosterone causing miniaturization of the hair follicles. Hair loss if first seen on the anterior scalp and bitemporal regions. The only clinically proven methods to prevent hair loss are Finasteride and Minoxidil. Neither Finasteride nor Minoxidil can cause hair to regrow. Once treatment is discontinued hair loss will progress.    Start finasteride 1.25 mg daily. Side effects of oral Finasteride include and are not limited to impotence, decreased libido, abnormal ejaculation, sexual dysfunction and gynecomastia.    Start applying Rogaine 5% foam once daily. Side effects of Minoxidil (Rogaine)  include contact dermatitis, pruritis (itch), and skin irritation.    Reviewed pertinent charts and labs prior to office visit.       Follow-up: 3-4 month(s) in-person, or earlier for new or changing lesions      Provider Disclosure:   The documentation recorded by the scribe accurately reflects the services I personally performed and the decisions made by me.    Twyla Gonzalez, MS, JOVANI      Scribe Disclosure:  I, Joy Ninfa, am serving as a scribe to document services personally performed by Twyla Gonzalez PA-C based on data collection and the provider's statements to me.   ____________________________________________________    HPI:  Mr. Sudarshan Alonzo is a(n) 26 year old male who presents today as a new patient for a few spots of concern. He reports a rash on his R arm. Patient states this has been present for a year.  Patient reports the following symptoms: itching occasionally.  Patient reports the following previous treatments: Lamisil for two weeks with some improvement.  Patient reports the following modifying factors: He does have toenail fungus.  Associated symptoms: none. He also points to a red dot on his forehead that is asymptomatic. He also points to a growth on his scrotum that has been present for 2-3 years. He also points to a large mole on his penis that has been present for his whole life. He also reports hair loss on the top of his head. His father is bald. Patient has no other skin complaints today.  Remainder of the HPI, Meds, PMH, Allergies, FH, and SH was reviewed in chart.           Medications:  Current Outpatient Medications   Medication     alfuzosin ER (UROXATRAL) 10 MG 24 hr tablet     atomoxetine (STRATTERA) 100 MG capsule     buPROPion (WELLBUTRIN XL) 150 MG 24 hr tablet     sertraline (ZOLOFT) 100 MG tablet     solifenacin (VESICARE) 10 MG tablet     tadalafil (CIALIS) 10 MG tablet     tadalafil (CIALIS) 20 MG tablet     No current facility-administered medications  for this visit.        Past Medical History:   There is no problem list on file for this patient.    Past Medical History:   Diagnosis Date     Anxiety      Bipolar disorder (H)      Depressive disorder      Gastroesophageal reflux disease        Past Surgical History:   Past Surgical History:   Procedure Laterality Date     COLONOSCOPY N/A 2022    Procedure: COLONOSCOPY, WITH BIOPSY;  Surgeon: Augustin Christy MD;  Location: [facility] OR       Family History:  Family History   Problem Relation Age of Onset     Mental Illness Mother        Social History:  Social History     Tobacco Use     Smoking status: Former Smoker     Packs/day: 0.00     Years: 5.00     Pack years: 0.00     Types: Cigars, Hookah, Dip, chew, snus or snuff     Start date: 2011     Quit date: 2019     Years since quittin.8     Smokeless tobacco: Former User     Quit date: 2019   Substance Use Topics     Alcohol use: Not Currently          Review Of Systems:  Skin: red, itchy rash  Eyes: negative  Ears/Nose/Throat: negative  Respiratory: No shortness of breath, dyspnea on exertion, cough, or hemoptysis  Cardiovascular: negative  Gastrointestinal: negative  Genitourinary: negative  Musculoskeletal: negative  Neurologic: negative  Psychiatric: negative  Hematologic/Lymphatic/Immunologic: negative  Endocrine: negative      Objective:     /74   Eyes: Conjunctivae/lids: Normal   ENT: Lips:  Normal  MSK: Normal  Cardiovascular: Peripheral edema none  Pulm: Breathing Normal  Neuro/Psych: Orientation: Normal; Mood/Affect: Normal, NAD, WDWN  Pt accompanied by: self  Following areas examined: Scalp, face (wearing mask), neck, R&L upper extremities, and groin/genitals.   Ayoub skin type: ii   Findings:  Pink scaly patches x 3 with central clearing and slightly raised borders with scale  Red smooth well-defined macules on forehead x2.   There is(are) skin colored pedunculated papule on the scrotum.   Well circumscribed  macules with symmetric color on right penis   Thinning of hair on the frontal, mid, frontotemporal scalp.          CC No referring provider defined for this encounter. on close of this encounter.

## 2022-05-13 ENCOUNTER — TELEPHONE (OUTPATIENT)
Dept: FAMILY MEDICINE | Facility: CLINIC | Age: 27
End: 2022-05-13
Payer: COMMERCIAL

## 2022-05-13 DIAGNOSIS — L65.9 ALOPECIA: Primary | ICD-10-CM

## 2022-05-13 NOTE — TELEPHONE ENCOUNTER
Prior Authorization Retail Medication Request    Medication/Dose: finasteride (PROPECIA) 1 MG tablet  ICD code (if different than what is on RX):    Previously Tried and Failed:    Rationale:      Insurance Name:  na  Insurance ID:  [id number]      Pharmacy Information (if different than what is on RX)  Name:  same  Phone:  same

## 2022-05-18 NOTE — TELEPHONE ENCOUNTER
Central Prior Authorization Team  Phone: [phone]    PA Initiation    Medication: finasteride (PROPECIA) 1 MG tablet  Insurance Company: [employer] - [employer] [place] ([facility]) - Phone [phone] Fax [phone]  Pharmacy Filling the Rx: [id number] - [place], [place] - [address] AT [facility] OF [address]  Filling Pharmacy Phone: [phone]  Filling Pharmacy Fax:    Start Date: 5/18/2022

## 2022-05-19 RX ORDER — FINASTERIDE 5 MG/1
TABLET, FILM COATED ORAL
Qty: 30 TABLET | Refills: 1 | Status: SHIPPED | OUTPATIENT
Start: 2022-05-19 | End: 2023-01-24

## 2022-05-19 NOTE — TELEPHONE ENCOUNTER
S/w pt and gave Breanne's reply below.  Pt is willing to try the 5 mg of finesteride.  Rx and pharmacy pended.    Estefanía FERNANDO RN  [facility]  [phone]

## 2022-05-19 NOTE — TELEPHONE ENCOUNTER
Please let pt know 1mg tab of finasteride not covered. Would he like me to send the 5mg tablets and then cut into 4ths?

## 2022-05-19 NOTE — TELEPHONE ENCOUNTER
PRIOR AUTHORIZATION DENIED    Medication: finasteride (PROPECIA) 1 MG tablet- DENIED    Denial Date: 5/18/2022    Denial Rational: This medication is excluded from the pt's pharmacy benefit.      Appeal Information: If provider would like to appeal please provide a letter of medical necessity.

## 2022-06-03 DIAGNOSIS — N52.9 ED (ERECTILE DYSFUNCTION): Primary | ICD-10-CM

## 2022-06-07 RX ORDER — TADALAFIL 10 MG/1
10 TABLET ORAL DAILY PRN
Qty: 30 TABLET | Refills: 1 | Status: SHIPPED | OUTPATIENT
Start: 2022-06-07 | End: 2024-02-05

## 2022-06-07 NOTE — TELEPHONE ENCOUNTER
tadalafil (CIALIS) 20 MG tablet  Last Written Prescription Date:  unknown  Last Fill Quantity: unknown,   # refills: unknown  Last Office Visit : 2/23/22  Future Office visit:  none    Routing refill request to provider for review/approval because :historical

## 2022-06-20 ENCOUNTER — VIRTUAL VISIT (OUTPATIENT)
Dept: UROLOGY | Facility: CLINIC | Age: 27
End: 2022-06-20
Payer: COMMERCIAL

## 2022-06-20 VITALS — WEIGHT: 225 LBS | HEIGHT: 74 IN | BODY MASS INDEX: 28.88 KG/M2

## 2022-06-20 DIAGNOSIS — N32.81 OAB (OVERACTIVE BLADDER): ICD-10-CM

## 2022-06-20 DIAGNOSIS — R39.9 LOWER URINARY TRACT SYMPTOMS (LUTS): Primary | ICD-10-CM

## 2022-06-20 DIAGNOSIS — N52.9 ERECTILE DYSFUNCTION, UNSPECIFIED ERECTILE DYSFUNCTION TYPE: ICD-10-CM

## 2022-06-20 PROCEDURE — 99215 OFFICE O/P EST HI 40 MIN: CPT | Mod: GT | Performed by: UROLOGY

## 2022-06-20 RX ORDER — SOLIFENACIN SUCCINATE 10 MG/1
10 TABLET, FILM COATED ORAL DAILY
Qty: 90 TABLET | Refills: 3 | Status: SHIPPED | OUTPATIENT
Start: 2022-06-20 | End: 2022-11-15

## 2022-06-20 RX ORDER — ALFUZOSIN HYDROCHLORIDE 10 MG/1
1 TABLET, EXTENDED RELEASE ORAL DAILY
Qty: 90 TABLET | Refills: 3 | Status: SHIPPED | OUTPATIENT
Start: 2022-06-20 | End: 2023-06-20

## 2022-06-20 RX ORDER — TADALAFIL 20 MG/1
20 TABLET ORAL DAILY
Qty: 90 TABLET | Refills: 3 | Status: SHIPPED | OUTPATIENT
Start: 2022-06-20 | End: 2023-09-13

## 2022-06-20 ASSESSMENT — PAIN SCALES - GENERAL: PAINLEVEL: NO PAIN (0)

## 2022-06-20 NOTE — PROGRESS NOTES
"[facility]  CHIEF COMPLAINT   It was my pleasure to see Sudarshan Alonzo who is a 26 year old male for follow-up of LUTS, OAB (over-active bladder).      HPI   Sudarshan Alonzo is a very pleasant 26 year old male    Initially seen by Twyla CASTRO - 3/3/2022:  \"HPI: Mr. Sudarshan Alonzo is a 26M with PMH significant for anxiety and depression who has also had mixed urinary symptoms since the end of college , ~5 years ago.  His brother and his father are urologists, both out[place]of[place]state.  His brother has offered informal advice and given him a trial of alfuzosin, but Sudarshan has never had a formal urology visit.     - Taking alfuzosin - without it, he really needs to push.  He once strained so hard that he fainted.  Now that he is on alfuzosin it has really helped.  But even on alfuzosin, his stream is weak, although he doesn't need to push quite as hard.    - Drinks a ton of water (more than 4L) and drinks 20-40 oz of caffeine per day, but still feels like he has terrible frequency even despite this.  Plus, he has always been a big water drinker. Nocturia x 12, the other night. Also has urgency if he drinks a lot of water quickly - feels sensation of needing to void at the tip of the penis. Never has had incontinence.    - Started Vesicare 10mg this week (from his brother).  Feels like it is helping.  Now gets up three times per night.    - Void frequently because he develops bladder discomfort. Discomfort resolves after he voids.  Never any pain   - Sometimes has dysuria - once per month - when he pees thick cloudy liquid that looks like ejaculate.    - AUA SS today 26 (2,5,3,4,5,3,5) \"unhappy\"  - No hematuria since a single episode when he was a child.   He became very dehydrated while playing tennis and thinks hematuria was caused by transient renal dysfunction.      - Unsure of cause for these symptoms.  His brother things an STD may have caused some scarring in the urethra.  Sudarshan has never tested positive " "for an STD.  Did once have a partner with HPV.    - Is athletic but never recalls any  injury  - He tested himself for DM - negative.   - On Zoloft --> impacts sexual function.  Has a girlfriend, uses Cialis to counter effects of Zoloft.  Using 20mg.    - Bowels: sometimes blood from the rectum (on the stool or dripping) - has had this on and off for a year.  Has upcoming colonoscopy scheduled to evaluate this.     TODAY 6/20/2022:  Positive for Ureaplasma on 3/3/22 - treated with Doxycycline 100mg BID for 7 days  No notable change with this    He drinks a lot water and coffee  He drinks 1-2 large cups of coffee    He did a sleep study last week given his concern for possible sleep apnea  He should have the results of this later this week    Dad and brother are practicing urologist in [place]     PHYSICAL EXAM  Patient is a 26 year old  male   Vitals: Height 1.88 m (6' 2\"), weight 102.1 kg (225 lb).  Body mass index is 28.89 kg/m .  General Appearance Adult:   Alert, no acute distress, oriented  HENT: throat/mouth:normal, good dentition  Lungs: no respiratory distress, or pursed lip breathing  Heart: No obvious jugular venous distension present  Abdomen: non - distended  Musculoskeltal: extremities normal, no peripheral edema  Skin: no suspicious lesions or rashes  Neuro: Alert, oriented, speech and mentation normal  Psych: affect and mood normal  Gait: Normal  : 3/3/22      Inguinal: no hernias or palpable lymph nodes      circumcised penis, no penile plaques or lesions. Has a raised mottled nontender mole (since birth) right shaft.       Orthotopic location of the urethral meatus.       Scrotum normal - tiny lesion anterior scrotal skin - fleshcolored, nontender      Testicles of normal firmness and consistency, no masses.      Epididymes bilaterally without masses or tenderness.         Vas and cord normal bilaterally.    LABS:  2/23/22 - UA - negative  2/23/22 - GC/CT - negative  2/23/22 - treponema - " negative  2/23/22 - glucose - 89mg/dL    UA RESULTS:  Recent Labs   Lab Test 02/23/22  1511   COLOR Yellow   APPEARANCE Clear   URINEGLC Negative   URINEBILI Negative   URINEKETONE Negative   SG 1.020   UBLD Negative   URINEPH 6.0   PROTEIN Negative   NITRITE Negative   LEUKEST Negative   RBCU 1   WBCU <1     UCx:  3/3/22: Ureaplasma positive    UROFLOW 3/3/22:  Flow was completely interrupted with short bursts of emptying that occured only with straining.  Qmax was 14.2mL/s with average flow of 3.9mL/s. Voids 181cc with PVR 110cc,  PVR: Residual urine by ultrasound was 100-120 ml.        ASSESSMENT and PLAN  26-year-old man with long history of LUTS, OAB, nocturia, anxiety and depression with erectile dysfunction    LUTS and OAB with nocturia  - We discussed the pathophysiology of the bladder and the prostate and the impacts of coffee on the kidneys and bladder  - We discussed the impacts of sleep apnea and he will have the results of his sleep study later this week  - He has been on alfuzosin and Vesicare which have both helped.  We discussed the mechanism of action of these medications  - I would like him to start by weaning off of coffee completely for a 2-week.  Of only water consumption  - We will plan for follow-up in 2 to 3 months with possible cystoscopy  - We discussed that etiologies include an overactive bladder versus component of bladder outlet obstruction either from a high bladder neck or urethral stricture  - Refill prescription for medication sent to the pharmacy    ED secondary to depression and anxiety treatments  - He has been doing well with his current antidepressant regimen  - We discussed the impact of these medications on erectile function  - He has been doing well on Cialis 20 mg as needed and refill prescription sent to the pharmacy      Time spent: 40 minutes spent on the date of the encounter doing chart review, history and exam, documentation and further activities as noted  above.    Rashel Edward MD   Urology  [facility]  [facility] Phone: [phone]  [facility] Phone: [phone]      Sudarshan is a 26 year old who is being evaluated via a billable video visit.      How would you like to obtain your AVS? MyChart  If the video visit is dropped, the invitation should be resent by: Text to cell phone: [phone]  Will anyone else be joining your video visit? No        Video-Visit Details    Video Start Time: 11:13 AM    Type of service:  Video Visit    Video End Time:11:37 AM    Originating Location (pt. Location): Home    Distant Location (provider location):  [facility]     Platform used for Video Visit: [employer]

## 2022-06-20 NOTE — LETTER
"6/20/2022       RE: Sudarshan Alonzo  [address]  [facility] [address]     Dear Colleague,    Thank you for referring your patient, Sudarshan Alonzo, to the [facility] at [facility]. Please see a copy of my visit note below.    [place]  CHIEF COMPLAINT   It was my pleasure to see Sudarshan Alonzo who is a 26 year old male for follow-up of LUTS, OAB (over-active bladder).      HPI   Sudarshan Alonzo is a very pleasant 26 year old male    Initially seen by Twyla CASTRO - 3/3/2022:  \"HPI: MrStorm Alonzo is a 26M with PMH significant for anxiety and depression who has also had mixed urinary symptoms since the end of college , ~5 years ago.  His brother and his father are urologists, both out[place]of[place]state.  His brother has offered informal advice and given him a trial of alfuzosin, but Sudarshan has never had a formal urology visit.     - Taking alfuzosin - without it, he really needs to push.  He once strained so hard that he fainted.  Now that he is on alfuzosin it has really helped.  But even on alfuzosin, his stream is weak, although he doesn't need to push quite as hard.    - Drinks a ton of water (more than 4L) and drinks 20-40 oz of caffeine per day, but still feels like he has terrible frequency even despite this.  Plus, he has always been a big water drinker. Nocturia x 12, the other night. Also has urgency if he drinks a lot of water quickly - feels sensation of needing to void at the tip of the penis. Never has had incontinence.    - Started Vesicare 10mg this week (from his brother).  Feels like it is helping.  Now gets up three times per night.    - Void frequently because he develops bladder discomfort. Discomfort resolves after he voids.  Never any pain   - Sometimes has dysuria - once per month - when he pees thick cloudy liquid that looks like ejaculate.    - AUA SS today 26 (2,5,3,4,5,3,5) \"unhappy\"  - No " "hematuria since a single episode when he was a child.   He became very dehydrated while playing tennis and thinks hematuria was caused by transient renal dysfunction.      - Unsure of cause for these symptoms.  His brother things an STD may have caused some scarring in the urethra.  Sudarshan has never tested positive for an STD.  Did once have a partner with HPV.    - Is athletic but never recalls any  injury  - He tested himself for DM - negative.   - On Zoloft --> impacts sexual function.  Has a girlfriend, uses Cialis to counter effects of Zoloft.  Using 20mg.    - Bowels: sometimes blood from the rectum (on the stool or dripping) - has had this on and off for a year.  Has upcoming colonoscopy scheduled to evaluate this.     TODAY 6/20/2022:  Positive for Ureaplasma on 3/3/22 - treated with Doxycycline 100mg BID for 7 days  No notable change with this    He drinks a lot water and coffee  He drinks 1-2 large cups of coffee    He did a sleep study last week given his concern for possible sleep apnea  He should have the results of this later this week    Dad and brother are practicing urologist in [place]     PHYSICAL EXAM  Patient is a 26 year old  male   Vitals: Height 1.88 m (6' 2\"), weight 102.1 kg (225 lb).  Body mass index is 28.89 kg/m .  General Appearance Adult:   Alert, no acute distress, oriented  HENT: throat/mouth:normal, good dentition  Lungs: no respiratory distress, or pursed lip breathing  Heart: No obvious jugular venous distension present  Abdomen: non - distended  Musculoskeltal: extremities normal, no peripheral edema  Skin: no suspicious lesions or rashes  Neuro: Alert, oriented, speech and mentation normal  Psych: affect and mood normal  Gait: Normal  : 3/3/22      Inguinal: no hernias or palpable lymph nodes      circumcised penis, no penile plaques or lesions. Has a raised mottled nontender mole (since birth) right shaft.       Orthotopic location of the urethral meatus.       Scrotum " normal - tiny lesion anterior scrotal skin - fleshcolored, nontender      Testicles of normal firmness and consistency, no masses.      Epididymes bilaterally without masses or tenderness.         Vas and cord normal bilaterally.    LABS:  2/23/22 - UA - negative  2/23/22 - GC/CT - negative  2/23/22 - treponema - negative  2/23/22 - glucose - 89mg/dL    UA RESULTS:  Recent Labs   Lab Test 02/23/22  1511   COLOR Yellow   APPEARANCE Clear   URINEGLC Negative   URINEBILI Negative   URINEKETONE Negative   SG 1.020   UBLD Negative   URINEPH 6.0   PROTEIN Negative   NITRITE Negative   LEUKEST Negative   RBCU 1   WBCU <1     UCx:  3/3/22: Ureaplasma positive    UROFLOW 3/3/22:  Flow was completely interrupted with short bursts of emptying that occured only with straining.  Qmax was 14.2mL/s with average flow of 3.9mL/s. Voids 181cc with PVR 110cc,  PVR: Residual urine by ultrasound was 100-120 ml.        ASSESSMENT and PLAN  26-year-old man with long history of LUTS, OAB, nocturia, anxiety and depression with erectile dysfunction    LUTS and OAB with nocturia  - We discussed the pathophysiology of the bladder and the prostate and the impacts of coffee on the kidneys and bladder  - We discussed the impacts of sleep apnea and he will have the results of his sleep study later this week  - He has been on alfuzosin and Vesicare which have both helped.  We discussed the mechanism of action of these medications  - I would like him to start by weaning off of coffee completely for a 2-week.  Of only water consumption  - We will plan for follow-up in 2 to 3 months with possible cystoscopy  - We discussed that etiologies include an overactive bladder versus component of bladder outlet obstruction either from a high bladder neck or urethral stricture  - Refill prescription for medication sent to the pharmacy    ED secondary to depression and anxiety treatments  - He has been doing well with his current antidepressant regimen  - We  discussed the impact of these medications on erectile function  - He has been doing well on Cialis 20 mg as needed and refill prescription sent to the pharmacy      Time spent: 40 minutes spent on the date of the encounter doing chart review, history and exam, documentation and further activities as noted above.    Rashel Edward MD   Urology  [facility]  [facility] Phone: [phone]  [facility] Phone: [phone]      Sudarshan is a 26 year old who is being evaluated via a billable video visit.      How would you like to obtain your AVS? MyChart  If the video visit is dropped, the invitation should be resent by: Text to cell phone: [phone]  Will anyone else be joining your video visit? No        Video-Visit Details    Video Start Time: 11:13 AM    Type of service:  Video Visit    Video End Time:11:37 AM    Originating Location (pt. Location): Home    Distant Location (provider location):  [facility]     Platform used for Video Visit: [employer]

## 2022-09-14 ENCOUNTER — OFFICE VISIT (OUTPATIENT)
Dept: UROLOGY | Facility: CLINIC | Age: 27
End: 2022-09-14
Payer: COMMERCIAL

## 2022-09-14 VITALS
SYSTOLIC BLOOD PRESSURE: 130 MMHG | BODY MASS INDEX: 28.88 KG/M2 | HEIGHT: 74 IN | DIASTOLIC BLOOD PRESSURE: 70 MMHG | WEIGHT: 225 LBS

## 2022-09-14 DIAGNOSIS — N32.81 OAB (OVERACTIVE BLADDER): ICD-10-CM

## 2022-09-14 DIAGNOSIS — R39.9 LOWER URINARY TRACT SYMPTOMS (LUTS): Primary | ICD-10-CM

## 2022-09-14 LAB
ALBUMIN UR-MCNC: NEGATIVE MG/DL
APPEARANCE UR: CLEAR
BILIRUB UR QL STRIP: NEGATIVE
COLOR UR AUTO: YELLOW
GLUCOSE UR STRIP-MCNC: NEGATIVE MG/DL
HGB UR QL STRIP: NEGATIVE
KETONES UR STRIP-MCNC: NEGATIVE MG/DL
LEUKOCYTE ESTERASE UR QL STRIP: NEGATIVE
NITRATE UR QL: NEGATIVE
PH UR STRIP: 6.5 [PH] (ref 5–7)
RESIDUAL VOLUME (RV) (EXTERNAL): 26
SP GR UR STRIP: 1.01 (ref 1–1.03)
UROBILINOGEN UR STRIP-ACNC: 0.2 E.U./DL

## 2022-09-14 PROCEDURE — 51798 US URINE CAPACITY MEASURE: CPT | Performed by: UROLOGY

## 2022-09-14 PROCEDURE — 99214 OFFICE O/P EST MOD 30 MIN: CPT | Mod: 25 | Performed by: UROLOGY

## 2022-09-14 PROCEDURE — 52000 CYSTOURETHROSCOPY: CPT | Performed by: UROLOGY

## 2022-09-14 PROCEDURE — 81003 URINALYSIS AUTO W/O SCOPE: CPT | Mod: QW | Performed by: UROLOGY

## 2022-09-14 RX ORDER — LIDOCAINE HYDROCHLORIDE 20 MG/ML
JELLY TOPICAL ONCE
Status: DISCONTINUED | OUTPATIENT
Start: 2022-09-14 | End: 2022-09-14 | Stop reason: HOSPADM

## 2022-09-14 ASSESSMENT — PAIN SCALES - GENERAL: PAINLEVEL: NO PAIN (0)

## 2022-09-14 NOTE — LETTER
"9/14/2022       RE: Sudarshan Alonzo  [address]  [facility] [address]     Dear Colleague,    Thank you for referring your patient, Sudarshan Alonzo, to the [facility] at [facility]. Please see a copy of my visit note below.    [place]  CHIEF COMPLAINT   It was my pleasure to see Sudarshan Alonzo who is a 27 year old male for follow-up of LUTS, OAB (over-active bladder).      HPI   Sudarshan Alonzo is a very pleasant 27 year old male    Initially seen by Twyla CASTRO - 3/3/2022:  \"HPI: MrStorm Alonzo is a 26M with PMH significant for anxiety and depression who has also had mixed urinary symptoms since the end of college , ~5 years ago.  His brother and his father are urologists, both out-of[place]state.  His brother has offered informal advice and given him a trial of alfuzosin, but Sudarshan has never had a formal urology visit.     - Taking alfuzosin - without it, he really needs to push.  He once strained so hard that he fainted.  Now that he is on alfuzosin it has really helped.  But even on alfuzosin, his stream is weak, although he doesn't need to push quite as hard.    - Drinks a ton of water (more than 4L) and drinks 20-40 oz of caffeine per day, but still feels like he has terrible frequency even despite this.  Plus, he has always been a big water drinker. Nocturia x 12, the other night. Also has urgency if he drinks a lot of water quickly - feels sensation of needing to void at the tip of the penis. Never has had incontinence.    - Started Vesicare 10mg this week (from his brother).  Feels like it is helping.  Now gets up three times per night.    - Void frequently because he develops bladder discomfort. Discomfort resolves after he voids.  Never any pain   - Sometimes has dysuria - once per month - when he pees thick cloudy liquid that looks like ejaculate.    - AUA SS today 26 (2,5,3,4,5,3,5) \"unhappy\"  - No " hematuria since a single episode when he was a child.   He became very dehydrated while playing tennis and thinks hematuria was caused by transient renal dysfunction.      - Unsure of cause for these symptoms.  His brother things an STD may have caused some scarring in the urethra.  Sudarshan has never tested positive for an STD.  Did once have a partner with HPV.    - Is athletic but never recalls any  injury  - He tested himself for DM - negative.   - On Zoloft --> impacts sexual function.  Has a girlfriend, uses Cialis to counter effects of Zoloft.  Using 20mg.    - Bowels: sometimes blood from the rectum (on the stool or dripping) - has had this on and off for a year.  Has upcoming colonoscopy scheduled to evaluate this.     6/20/2022:  Positive for Ureaplasma on 3/3/22 - treated with Doxycycline 100mg BID for 7 days  No notable change with this    He drinks a lot water and coffee  He drinks 1-2 large cups of coffee    He did a sleep study last week given his concern for possible sleep apnea  He should have the results of this later this week    Dad and brother are practicing urologist in [place]     TODAY 9/14/2022:  Follow-up today for possible cystoscopy  He has continued on his alfuzosin and Vesicare  He did run out of the alfuzosin and it took a few days to get a refill and he did note a decrease in his urine stream    AUASS: 1-8-5-3-5-4-5 = 28  QOL = 5  PVR = 26cc    PHYSICAL EXAM  Patient is a 27 year old  male   Vitals: There were no vitals taken for this visit.  There is no height or weight on file to calculate BMI.  General Appearance Adult:   Alert, no acute distress, oriented  HENT: throat/mouth:normal, good dentition  Lungs: no respiratory distress, or pursed lip breathing  Heart: No obvious jugular venous distension present  Abdomen: non - distended  Musculoskeltal: extremities normal, no peripheral edema  Skin: no suspicious lesions or rashes  Neuro: Alert, oriented, speech and mentation  normal  Psych: affect and mood normal  Gait: Normal  : 3/3/22      Inguinal: no hernias or palpable lymph nodes      circumcised penis, no penile plaques or lesions. Has a raised mottled nontender mole (since birth) right shaft.       Orthotopic location of the urethral meatus.       Scrotum normal - tiny lesion anterior scrotal skin - fleshcolored, nontender      Testicles of normal firmness and consistency, no masses.      Epididymes bilaterally without masses or tenderness.         Vas and cord normal bilaterally.    LABS:  2/23/22 - UA - negative  2/23/22 - GC/CT - negative  2/23/22 - treponema - negative  2/23/22 - glucose - 89mg/dL    UA RESULTS:  Recent Labs   Lab Test 09/14/22  1334 02/23/22  1511   COLOR Yellow Yellow   APPEARANCE Clear Clear   URINEGLC Negative Negative   URINEBILI Negative Negative   URINEKETONE Negative Negative   SG 1.015 1.020   UBLD Negative Negative   URINEPH 6.5 6.0   PROTEIN Negative Negative   UROBILINOGEN 0.2  --    NITRITE Negative Negative   LEUKEST Negative Negative   RBCU  --  1   WBCU  --  <1        UCx:  3/3/22: Ureaplasma positive    UROFLOW 3/3/22:  Flow was completely interrupted with short bursts of emptying that occured only with straining.  Qmax was 14.2mL/s with average flow of 3.9mL/s. Voids 181cc with PVR 110cc,  PVR: Residual urine by ultrasound was 100-120 ml.        ASSESSMENT and PLAN  27-year-old man with long history of LUTS, OAB, nocturia, anxiety and depression with erectile dysfunction    LUTS and OAB with nocturia  - We discussed the pathophysiology of the bladder and the prostate and the impacts of coffee on the kidneys and bladder  - We discussed the impacts of sleep apnea.  He underwent a at home sleep study with was inconclusive and is scheduled for an in office sleep study in October  - He has been on alfuzosin and Vesicare which have both helped.  We discussed the mechanism of action of these medications  - Cystoscopy today with no clear evidence  of bladder outlet obstruction  - We will plan to proceed with urodynamics    ED secondary to depression and anxiety treatments  - He has been doing well with his current antidepressant regimen  - We discussed the impact of these medications on erectile function  - He has been doing well on Cialis 20 mg as needed and refill prescription sent to the pharmacy previously      Time spent: 20 minutes spent on the date of the encounter doing chart review, history and exam, documentation and further activities as noted above.  This was in addition to cystoscopy time    Rashel Edward MD   Urology  [facility]  [facility] Phone: [phone]  [facility] Phone: [phone]

## 2022-09-14 NOTE — PROCEDURES
CYSTOSCOPY PROCEDURE NOTE:    Sudarshan Alonzo is a 27 year old male  who presents with LUTS and OAB (over-active bladder) for cystoscopy .    Pt ID verified with patient: Yes     Procedure verified with patient: Yes     Procedure confirmed with physician and support staff: Yes     Consent form confirmed with physician and support staff.    Sign In  History and Physical Exam reviewe.  Informed Consent Discussed: Yes   Sign in Communication: Yes   Time Out:  Team Confirms the Correct Patient, Correct Procedure; Yes , Correct Site and Site Marking, Correct Position (if applicable).    Affirmation of Time Out: Yes   Sign Out:  Sign Out Discussion: Yes   Physician: Rashel Edward MD    A urinalysis was performed revealing no evidence of infection.    The benefits, risks, alternatives of the cystoscopy procedure and personnel were discussed with the patient. The verbal consent was obtained and the patient agrees to proceed.      Description of procedure:   After fully informed, voluntary consent was obtained, the patient was brought into the procedure room, identified and placed in a supine position on the cystoscopy table.  The groin/scrotum were prepped with betadine and draped in a sterile fashion.  Urojet lidocaine gel was introduced.  A 15F flexible cystoscope was inserted into the urethra, and the bladder and urethra wereexamined in a systematic manner.  The patient tolerated the procedure well and there were no complications.      Cystoscopic findings:  The urethra was normal without strictures.  The prostate was 2cm long and demonstrated mild bilobar hypertrophy.  There was no median lobe.  The external sphincter coapted normally and the bladder neck was slightly high. The bladder was  entered and careful pan endoscopy was carried out. The posterior, superior and lateral walls and dome of the bladder were all well visualized and the scope was retroflexed upon itself..  There was no trabeculation.  There were no  neoplasms, stones, or diverticula identifed.  The ureteric orifices were normal in position and number and effluxing clear urine.    Assessment/Plan:   Sudarshan Alonzo is a 27 year old male with a history of LUTS and OAB (over-active bladder)     -See clinic note      Rashel Edward MD

## 2022-09-14 NOTE — PROGRESS NOTES
"[facility]  CHIEF COMPLAINT   It was my pleasure to see Sudarshan Alonzo who is a 27 year old male for follow-up of LUTS, OAB (over-active bladder).      HPI   Sudarshan Alonzo is a very pleasant 27 year old male    Initially seen by wTyla CASTRO - 3/3/2022:  \"HPI: Mr. Sudarshan Alonzo is a 26M with PMH significant for anxiety and depression who has also had mixed urinary symptoms since the end of college , ~5 years ago.  His brother and his father are urologists, both out-of[place]state.  His brother has offered informal advice and given him a trial of alfuzosin, but Sudarshan has never had a formal urology visit.     - Taking alfuzosin - without it, he really needs to push.  He once strained so hard that he fainted.  Now that he is on alfuzosin it has really helped.  But even on alfuzosin, his stream is weak, although he doesn't need to push quite as hard.    - Drinks a ton of water (more than 4L) and drinks 20-40 oz of caffeine per day, but still feels like he has terrible frequency even despite this.  Plus, he has always been a big water drinker. Nocturia x 12, the other night. Also has urgency if he drinks a lot of water quickly - feels sensation of needing to void at the tip of the penis. Never has had incontinence.    - Started Vesicare 10mg this week (from his brother).  Feels like it is helping.  Now gets up three times per night.    - Void frequently because he develops bladder discomfort. Discomfort resolves after he voids.  Never any pain   - Sometimes has dysuria - once per month - when he pees thick cloudy liquid that looks like ejaculate.    - AUA SS today 26 (2,5,3,4,5,3,5) \"unhappy\"  - No hematuria since a single episode when he was a child.   He became very dehydrated while playing tennis and thinks hematuria was caused by transient renal dysfunction.      - Unsure of cause for these symptoms.  His brother things an STD may have caused some scarring in the urethra.  Sudarshan has never tested positive " for an STD.  Did once have a partner with HPV.    - Is athletic but never recalls any  injury  - He tested himself for DM - negative.   - On Zoloft --> impacts sexual function.  Has a girlfriend, uses Cialis to counter effects of Zoloft.  Using 20mg.    - Bowels: sometimes blood from the rectum (on the stool or dripping) - has had this on and off for a year.  Has upcoming colonoscopy scheduled to evaluate this.     6/20/2022:  Positive for Ureaplasma on 3/3/22 - treated with Doxycycline 100mg BID for 7 days  No notable change with this    He drinks a lot water and coffee  He drinks 1-2 large cups of coffee    He did a sleep study last week given his concern for possible sleep apnea  He should have the results of this later this week    Dad and brother are practicing urologist in [place]     TODAY 9/14/2022:  Follow-up today for possible cystoscopy  He has continued on his alfuzosin and Vesicare  He did run out of the alfuzosin and it took a few days to get a refill and he did note a decrease in his urine stream    AUASS: 1-4-8-3-5-4-5 = 28  QOL = 5  PVR = 26cc    PHYSICAL EXAM  Patient is a 27 year old  male   Vitals: There were no vitals taken for this visit.  There is no height or weight on file to calculate BMI.  General Appearance Adult:   Alert, no acute distress, oriented  HENT: throat/mouth:normal, good dentition  Lungs: no respiratory distress, or pursed lip breathing  Heart: No obvious jugular venous distension present  Abdomen: non - distended  Musculoskeltal: extremities normal, no peripheral edema  Skin: no suspicious lesions or rashes  Neuro: Alert, oriented, speech and mentation normal  Psych: affect and mood normal  Gait: Normal  : 3/3/22      Inguinal: no hernias or palpable lymph nodes      circumcised penis, no penile plaques or lesions. Has a raised mottled nontender mole (since birth) right shaft.       Orthotopic location of the urethral meatus.       Scrotum normal - tiny lesion anterior  scrotal skin - fleshcolored, nontender      Testicles of normal firmness and consistency, no masses.      Epididymes bilaterally without masses or tenderness.         Vas and cord normal bilaterally.    LABS:  2/23/22 - UA - negative  2/23/22 - GC/CT - negative  2/23/22 - treponema - negative  2/23/22 - glucose - 89mg/dL    UA RESULTS:  Recent Labs   Lab Test 09/14/22  1334 02/23/22  1511   COLOR Yellow Yellow   APPEARANCE Clear Clear   URINEGLC Negative Negative   URINEBILI Negative Negative   URINEKETONE Negative Negative   SG 1.015 1.020   UBLD Negative Negative   URINEPH 6.5 6.0   PROTEIN Negative Negative   UROBILINOGEN 0.2  --    NITRITE Negative Negative   LEUKEST Negative Negative   RBCU  --  1   WBCU  --  <1        UCx:  3/3/22: Ureaplasma positive    UROFLOW 3/3/22:  Flow was completely interrupted with short bursts of emptying that occured only with straining.  Qmax was 14.2mL/s with average flow of 3.9mL/s. Voids 181cc with PVR 110cc,  PVR: Residual urine by ultrasound was 100-120 ml.        ASSESSMENT and PLAN  27-year-old man with long history of LUTS, OAB, nocturia, anxiety and depression with erectile dysfunction    LUTS and OAB with nocturia  - We discussed the pathophysiology of the bladder and the prostate and the impacts of coffee on the kidneys and bladder  - We discussed the impacts of sleep apnea.  He underwent a at home sleep study with was inconclusive and is scheduled for an in office sleep study in October  - He has been on alfuzosin and Vesicare which have both helped.  We discussed the mechanism of action of these medications  - Cystoscopy today with no clear evidence of bladder outlet obstruction  - We will plan to proceed with urodynamics    ED secondary to depression and anxiety treatments  - He has been doing well with his current antidepressant regimen  - We discussed the impact of these medications on erectile function  - He has been doing well on Cialis 20 mg as needed and refill  prescription sent to the pharmacy previously      Time spent: 20 minutes spent on the date of the encounter doing chart review, history and exam, documentation and further activities as noted above.  This was in addition to cystoscopy time    Rashel Edward MD   Urology  [facility]  [facility] Phone: [phone]  [facility] Phone: [phone]

## 2022-09-14 NOTE — NURSING NOTE
Chief Complaint   Patient presents with     LUTS     OAB     PVR,AUA     PVR scan was 26ml today    Prior to the start of the procedure and with procedural staff participation, I verbally confirmed the patient s identity using two indicators, relevant allergies, that the procedure was appropriate and matched the consent or emergent situation, and that the correct equipment/implants were available. Immediately prior to starting the procedure I conducted the Time Out with the procedural staff and re-confirmed the patient s name, procedure, and site/side. I have wiped the patient off with the povidone-Iodine solution, draped them,  used Lidocaine hydrochloride jelly, and instilled sterile water into the bladder. (The Joint Commission universal protocol was followed.)  Yes    Sedation (Moderate or Deep): Urojet    5mL 2% lidocaine hydrochloride Urojet instilled into urethra.    NDC# [address]-5  Lot #: [id number]  Expiration Date:  12-23      Marly Man

## 2022-09-18 ENCOUNTER — HEALTH MAINTENANCE LETTER (OUTPATIENT)
Age: 27
End: 2022-09-18

## 2022-10-03 ENCOUNTER — PRE VISIT (OUTPATIENT)
Dept: UROLOGY | Facility: CLINIC | Age: 27
End: 2022-10-03

## 2022-11-15 ENCOUNTER — TELEPHONE (OUTPATIENT)
Dept: UROLOGY | Facility: CLINIC | Age: 27
End: 2022-11-15

## 2022-11-15 DIAGNOSIS — N32.81 OAB (OVERACTIVE BLADDER): ICD-10-CM

## 2022-11-15 RX ORDER — SOLIFENACIN SUCCINATE 10 MG/1
10 TABLET, FILM COATED ORAL DAILY
Qty: 90 TABLET | Refills: 3 | Status: SHIPPED | OUTPATIENT
Start: 2022-11-15 | End: 2023-11-28

## 2022-11-15 NOTE — TELEPHONE ENCOUNTER
[facility] Call Center    Phone Message    May a detailed message be left on voicemail: yes     Reason for Call: Medication Refill Request    Has the patient contacted the pharmacy for the refill? Yes   Name of medication being requested: solifenacin (VESICARE) 10 MG tablet  Provider who prescribed the medication:   Pharmacy: [facility] [address] [facility] [address]  Date medication is needed: asap- pt is going out of town today and needs this medication sent asap, it was originally sent to [facility], but they don't have the rx in stock, please send to the pharmacy listed above, thank you         Action Taken: Message routed to:  Other: uro    Travel Screening: Not Applicable

## 2023-01-24 ENCOUNTER — VIRTUAL VISIT (OUTPATIENT)
Dept: INTERNAL MEDICINE | Facility: CLINIC | Age: 28
End: 2023-01-24
Payer: COMMERCIAL

## 2023-01-24 DIAGNOSIS — L65.9 ALOPECIA: ICD-10-CM

## 2023-01-24 DIAGNOSIS — F42.9 OBSESSIVE-COMPULSIVE DISORDER, UNSPECIFIED TYPE: Primary | ICD-10-CM

## 2023-01-24 PROCEDURE — 99213 OFFICE O/P EST LOW 20 MIN: CPT | Mod: GT | Performed by: INTERNAL MEDICINE

## 2023-01-24 RX ORDER — FINASTERIDE 5 MG/1
TABLET, FILM COATED ORAL
Qty: 30 TABLET | Refills: 1 | Status: SHIPPED | OUTPATIENT
Start: 2023-01-24

## 2023-01-24 NOTE — TELEPHONE ENCOUNTER
Routing refill request to provider for review/approval because:  Drug not on the [facility] refill protocol   Pt last seen by Twyla Gonzalez May 2022    Estefanía FERNANDO RN  [facility]  [phone]

## 2023-01-24 NOTE — PROGRESS NOTES
"Sudarshan is a 27 year old who is being evaluated via a billable video visit.      How would you like to obtain your AVS? {AVS Preference:379827}  If the video visit is dropped, the invitation should be resent by: {video visit invitation (Optional) :438791}  Will anyone else be joining your video visit? {:342737}  {If patient encounters technical issues they should call [phone] :121270}        {PROVIDER CHARTING PREFERENCE:861103}    Subjective   Sudarshan is a 27 year old, presenting for the following health issues:  Video Visit (Mental health care consult)      HPI     Patient scheduled a visit to discuss OCD. He has been diagnosed in high school and has been seen by several specialists in the past. He has tried to get connected with a program for OCD treatment, however, has not been able to find a covered provider. Patient needs a provider referral to get in-network rate. He is currently seeing Dr. Nader Mcdaniel. He also needs a form completed on in-network insurance gap for coverage.     Review of Systems   {ROS COMP (Optional):216798}      Objective           Vitals:  No vitals were obtained today due to virtual visit.    Physical Exam   {video visit exam brief selected:827386::\"GENERAL: Healthy, alert and no distress\",\"EYES: Eyes grossly normal to inspection.  No discharge or erythema, or obvious scleral/conjunctival abnormalities.\",\"RESP: No audible wheeze, cough, or visible cyanosis.  No visible retractions or increased work of breathing.  \",\"SKIN: Visible skin clear. No significant rash, abnormal pigmentation or lesions.\",\"NEURO: Cranial nerves grossly intact.  Mentation and speech appropriate for age.\",\"PSYCH: Mentation appears normal, affect normal/bright, judgement and insight intact, normal speech and appearance well-groomed.\"}    {Diagnostic Test Results (Optional):825766}    {AMBULATORY ATTESTATION (Optional):179037}        Video-Visit Details    Type of service:  Video Visit     Originating " "Location (pt. Location): {video visit patient location:733361::\"Home\"}  {PROVIDER LOCATION On-site should be selected for visits conducted from your clinic location or adjoining [facility] hospital, academic office, or other nearby [facility] building. Off-site should be selected for all other provider locations, including home:228924}  Distant Location (provider location):  {virtual location provider:348541}  Platform used for Video Visit: {Virtual Visit Platforms:148579::\"[email or web address]\"}    "

## 2023-01-24 NOTE — PROGRESS NOTES
Primary Care Center  Internal Medicine    Chief Complaint   Patient presents with     Video Visit     Mental health care consult       Primary Care Provider: Elio Veliz MD    Subjective:    HPI:  Sudarshan Alonzo is a/an 27 year old male with a past medical history as noted below, who presents today with the above chief complaint. The patient reports he sees a psychologist named Dr. Nader Mcdaniel based out of [place] for OCD and needs a referral from an in-network provider for his insurance to cover his visits. He has no further concerns or questions at this time.     Review of systems:  See HPI    There is no problem list on file for this patient.    Past Medical History:   Diagnosis Date     Anxiety      Bipolar disorder (H)      Depressive disorder      Gastroesophageal reflux disease      Past Surgical History:   Procedure Laterality Date     COLONOSCOPY N/A 4/6/2022    Procedure: COLONOSCOPY, WITH BIOPSY;  Surgeon: Augustin Christy MD;  [place] [facility] [place]     Current Outpatient Medications   Medication Sig Dispense Refill     alfuzosin ER (UROXATRAL) 10 MG 24 hr tablet Take 1 tablet (10 mg) by mouth daily 90 tablet 3     atomoxetine (STRATTERA) 100 MG capsule Take 100 mg by mouth daily       buPROPion (WELLBUTRIN XL) 150 MG 24 hr tablet Take 150 mg by mouth daily       finasteride (PROPECIA) 1 MG tablet Take 1 tablet (1 mg) by mouth daily 90 tablet 1     finasteride (PROSCAR) 5 MG tablet Cut tablet into 1/4th and take 1/4th by mouth daily 30 tablet 1     sertraline (ZOLOFT) 100 MG tablet 200 mg daily  0     solifenacin (VESICARE) 10 MG tablet Take 1 tablet (10 mg) by mouth daily 90 tablet 3     tadalafil (CIALIS) 10 MG tablet Take 1 tablet (10 mg) by mouth daily as needed (erectile dysfunction) 30 tablet 1     tadalafil (CIALIS) 20 MG tablet Take 1 tablet (20 mg) by mouth daily 90 tablet 3     triamcinolone (ARISTOCORT HP) 0.5 % external cream Apply topically 2 times daily To affected  "area on right arm for 2 weeks. (Patient not taking: Reported on 9/14/2022) 60 g 0     No Known Allergies  Social History     Tobacco Use     Smoking status: Former     Packs/day: 0.00     Years: 5.00     Pack years: 0.00     Types: Cigars, Hookah, Dip, chew, snus or snuff, Cigarettes     Start date: 8/22/2011     Quit date: 6/22/2019     Years since quitting: 3.5     Smokeless tobacco: Former     Quit date: 6/22/2019   Substance Use Topics     Alcohol use: Not Currently     Drug use: Not Currently     Types: Cocaine, Marijuana, Benzodiazepines     Family History   Problem Relation Age of Onset     Mental Illness Mother      Objective:  BP Readings from Last 6 Encounters:   09/14/22 130/70   05/12/22 126/74   04/06/22 112/70   03/03/22 130/75   02/23/22 135/83   08/05/19 116/75     Wt Readings from Last 5 Encounters:   09/14/22 102.1 kg (225 lb)   06/20/22 102.1 kg (225 lb)   04/06/22 101.2 kg (223 lb)   02/23/22 104.3 kg (230 lb)   08/05/19 101.2 kg (223 lb)     Estimated body mass index is 28.89 kg/m  as calculated from the following:    Height as of 9/14/22: 1.88 m (6' 2\").    Weight as of 9/14/22: 102.1 kg (225 lb).  There were no vitals taken for this visit.    Physical Exam:    General: Alert and oriented without distress  HEENT: Normocephalic/atraumatic  Respiratory: Patient breathing comfortably on room air without increased respiratory effort or accessory muscle use  Cardiovascular: Appears well perfused  Skin: No overt lesions, bruises, rashes or bleeding on exposed skin surfaces.  Neuro: CN II-XII grossly intact.     Assessment and Plan:    Sudarshan was seen today for video visit.    Diagnoses and all orders for this visit:    Obsessive-compulsive disorder, unspecified type  Patient requests a referral for his insurance to cover his psychology visits for OCD. He states he has been seeing Dr. Nader Mcdaniel who is based out of [place] and has been very helpful for him. He has tried unsuccessfully to " find an in-network provider here and would like to try to have his insurance cover the visits which he has so far been paying for out of pocket.   -     Adult Mental Health  Referral; Future    Patient seen and case dicussed with Dr. Galdamez who agrees with the above assessment and plan    Steven Orlando, DO  PGY-2, Internal Medicine  [facility]      Patients: if you have questions or concerns about this progress note, please discuss them with the provider at a future office visit.    Physician Attestation   I, Elyse Galdamez MD, saw this patient and agree with the findings and plan of care as documented in the note.      Elyse Galdamez MD

## 2023-02-10 ENCOUNTER — OFFICE VISIT (OUTPATIENT)
Dept: FAMILY MEDICINE | Facility: CLINIC | Age: 28
End: 2023-02-10
Payer: COMMERCIAL

## 2023-02-10 VITALS
HEIGHT: 74 IN | OXYGEN SATURATION: 99 % | BODY MASS INDEX: 28.38 KG/M2 | DIASTOLIC BLOOD PRESSURE: 75 MMHG | HEART RATE: 66 BPM | SYSTOLIC BLOOD PRESSURE: 114 MMHG | WEIGHT: 221.1 LBS

## 2023-02-10 DIAGNOSIS — M25.511 ACUTE PAIN OF RIGHT SHOULDER: ICD-10-CM

## 2023-02-10 DIAGNOSIS — Z00.00 HEALTH CARE MAINTENANCE: ICD-10-CM

## 2023-02-10 DIAGNOSIS — F32.A DEPRESSIVE DISORDER: Primary | ICD-10-CM

## 2023-02-10 PROCEDURE — 99395 PREV VISIT EST AGE 18-39: CPT | Mod: 25 | Performed by: FAMILY MEDICINE

## 2023-02-10 PROCEDURE — 90686 IIV4 VACC NO PRSV 0.5 ML IM: CPT | Performed by: FAMILY MEDICINE

## 2023-02-10 PROCEDURE — 90471 IMMUNIZATION ADMIN: CPT | Performed by: FAMILY MEDICINE

## 2023-02-10 ASSESSMENT — PATIENT HEALTH QUESTIONNAIRE - PHQ9
SUM OF ALL RESPONSES TO PHQ QUESTIONS 1-9: 10
5. POOR APPETITE OR OVEREATING: SEVERAL DAYS

## 2023-02-10 ASSESSMENT — ANXIETY QUESTIONNAIRES
GAD7 TOTAL SCORE: 11
2. NOT BEING ABLE TO STOP OR CONTROL WORRYING: MORE THAN HALF THE DAYS
GAD7 TOTAL SCORE: 11
6. BECOMING EASILY ANNOYED OR IRRITABLE: MORE THAN HALF THE DAYS
1. FEELING NERVOUS, ANXIOUS, OR ON EDGE: SEVERAL DAYS
3. WORRYING TOO MUCH ABOUT DIFFERENT THINGS: MORE THAN HALF THE DAYS
IF YOU CHECKED OFF ANY PROBLEMS ON THIS QUESTIONNAIRE, HOW DIFFICULT HAVE THESE PROBLEMS MADE IT FOR YOU TO DO YOUR WORK, TAKE CARE OF THINGS AT HOME, OR GET ALONG WITH OTHER PEOPLE: SOMEWHAT DIFFICULT
5. BEING SO RESTLESS THAT IT IS HARD TO SIT STILL: NEARLY EVERY DAY
7. FEELING AFRAID AS IF SOMETHING AWFUL MIGHT HAPPEN: NOT AT ALL

## 2023-02-10 NOTE — NURSING NOTE
Sudarshan HURST [place] received the influenza vaccine in clinic today at the request of Dr. Veliz. The immunization site was cleaned with an alcohol prep wipe. The immunization was given without incident--see immunization list for administration details. No swelling or redness was observed at the site of injection after the immunization was given. Pt has no history of reaction to vaccines. Pt remained in [facility] for at least 15 minutes in case of an adverse reaction.     CHRISTY Hooper at 4:08 PM on 2/10/2023

## 2023-02-10 NOTE — NURSING NOTE
Sudarshan Alonzo is a 27 year old male patient that presents today in clinic for the following:    Chief Complaint   Patient presents with     Physical     The patient's allergies and medications were reviewed as noted. A set of vitals were recorded as noted without incident. The patient does not have any other questions for the provider.    Azalea Michaels, EMT at 3:43 PM on 2/10/2023

## 2023-02-10 NOTE — PROGRESS NOTES
"  Assessment & Plan     Depressive disorder  Cont w/ outside MH team based in [place], meds very helpful    Health care maintenance  Due, discussed diet exercise  - INFLUENZA VACCINE >6 MONTHS (AFLURIA/FLUZONE)  - Lipid panel reflex to direct LDL Fasting; Future    Acute pain of right shoulder  Let us know if not better  - Physical Therapy Referral; Future    Review of external notes as documented elsewhere in note  36 minutes spent on the date of the encounter doing chart review, history and exam, documentation and further activities per the note    BMI:   Estimated body mass index is 28.39 kg/m  as calculated from the following:    Height as of this encounter: 1.88 m (6' 2\").    Weight as of this encounter: 100.3 kg (221 lb 1.6 oz).     Depression Screening Follow Up    PHQ 2/10/2023   PHQ-9 Total Score 10   Q9: Thoughts of better off dead/self-harm past 2 weeks Not at all   F/U: Thoughts of suicide or self-harm -   F/U: Self harm-plan -   F/U: Self-harm action -   F/U: Safety concerns -         Follow Up Actions Taken  Crisis resource information provided in After Visit Summary  Referred patient back to current mental health provider.       Elio Veliz MD  [facility] [place]    Rasheed Heaton is a 27 year old, presenting for the following health issues:  Physical      HPI Here in physical  1-R shoulder pain, limits workouts, pain w/ abd/ext rot, not in neck/arm or weak  2-defers std tests, just had covid I suggest booster in spring  3-he will do flu shot today  4-No new FH  5-recent dx WARREN rvwd, gets CPAPA soon, he hopes will help w/ nocturia  My previous notes rvwd in visit    Past Medical History:   Diagnosis Date     Anxiety      Depressive disorder      Gastroesophageal reflux disease      Past Surgical History:   Procedure Laterality Date     COLONOSCOPY N/A 4/6/2022    Procedure: COLONOSCOPY, WITH BIOPSY;  Surgeon: Augustin Christy MD;  Location: [facility] [place] "     Current Outpatient Medications   Medication     alfuzosin ER (UROXATRAL) 10 MG 24 hr tablet     atomoxetine (STRATTERA) 100 MG capsule     buPROPion (WELLBUTRIN XL) 150 MG 24 hr tablet     finasteride (PROSCAR) 5 MG tablet     sertraline (ZOLOFT) 100 MG tablet     solifenacin (VESICARE) 10 MG tablet     tadalafil (CIALIS) 20 MG tablet     finasteride (PROPECIA) 1 MG tablet     tadalafil (CIALIS) 10 MG tablet     triamcinolone (ARISTOCORT HP) 0.5 % external cream     No current facility-administered medications for this visit.     No Known Allergies  Family History   Problem Relation Age of Onset     Mental Illness Mother      Social History     Socioeconomic History     Marital status: Single     Spouse name: Not on file     Number of children: Not on file     Years of education: Not on file     Highest education level: Not on file   Occupational History     Not on file   Tobacco Use     Smoking status: Former     Packs/day: 0.00     Years: 5.00     Pack years: 0.00     Types: Cigars, Hookah, Dip, chew, snus or snuff, Cigarettes     Start date: 8/22/2011     Quit date: 6/22/2019     Years since quitting: 3.6     Smokeless tobacco: Former     Quit date: 6/22/2019   Substance and Sexual Activity     Alcohol use: Not Currently     Drug use: Not Currently     Types: Cocaine, Marijuana, Benzodiazepines     Sexual activity: Yes     Partners: Female     Birth control/protection: Condom   Other Topics Concern     Not on file   Social History Narrative     Not on file     Social Determinants of Health     Financial Resource Strain: Not on file   Food Insecurity: Not on file   Transportation Needs: Not on file   Physical Activity: Not on file   Stress: Not on file   Social Connections: Not on file   Intimate Partner Violence: Not on file   Housing Stability: Not on file           Review of Systems   Ten pt ROS otherwise negative      Objective    /75 (BP Location: Right arm, Patient Position: Sitting, Cuff Size:  "Adult Regular)   Pulse 66   Ht 1.88 m (6' 2\")   Wt 100.3 kg (221 lb 1.6 oz)   SpO2 99%   BMI 28.39 kg/m    Body mass index is 28.39 kg/m .  Physical Exam   GENERAL: healthy, alert and no distress  EYES: Eyes grossly normal to inspection, PERRL and conjunctivae and sclerae normal  HENT: ear canals and TM's normal, nose and mouth without ulcers or lesions  NECK: no adenopathy, no asymmetry, masses, or scars and thyroid normal to palpation  RESP: lungs clear to auscultation - no rales, rhonchi or wheezes  CV: regular rate and rhythm, normal S1 S2, no S3 or S4, no murmur, click or rub, no peripheral edema and peripheral pulses strong  ABDOMEN: soft, nontender, no hepatosplenomegaly, no masses and bowel sounds normal   (male): normal male genitalia without lesions or urethral discharge, no hernia  MS: no gross musculoskeletal defects noted, no edema  SKIN: no suspicious lesions or rashes  NEURO: Normal strength and tone, mentation intact and speech normal  PSYCH: mentation appears normal, affect normal/bright            "

## 2023-02-17 ENCOUNTER — LAB (OUTPATIENT)
Dept: LAB | Facility: CLINIC | Age: 28
End: 2023-02-17
Payer: COMMERCIAL

## 2023-02-17 DIAGNOSIS — Z00.00 HEALTH CARE MAINTENANCE: ICD-10-CM

## 2023-02-17 LAB
CHOLEST SERPL-MCNC: 171 MG/DL
HDLC SERPL-MCNC: 48 MG/DL
LDLC SERPL CALC-MCNC: 107 MG/DL
NONHDLC SERPL-MCNC: 123 MG/DL
TRIGL SERPL-MCNC: 78 MG/DL

## 2023-02-17 PROCEDURE — 80061 LIPID PANEL: CPT | Performed by: PATHOLOGY

## 2023-02-17 PROCEDURE — 36415 COLL VENOUS BLD VENIPUNCTURE: CPT | Performed by: PATHOLOGY

## 2023-02-24 ENCOUNTER — THERAPY VISIT (OUTPATIENT)
Dept: PHYSICAL THERAPY | Facility: CLINIC | Age: 28
End: 2023-02-24
Payer: COMMERCIAL

## 2023-02-24 DIAGNOSIS — M25.511 ACUTE PAIN OF RIGHT SHOULDER: ICD-10-CM

## 2023-02-24 PROCEDURE — 97161 PT EVAL LOW COMPLEX 20 MIN: CPT | Mod: GP | Performed by: PHYSICAL THERAPIST

## 2023-02-24 PROCEDURE — 97110 THERAPEUTIC EXERCISES: CPT | Mod: GP | Performed by: PHYSICAL THERAPIST

## 2023-02-24 NOTE — PROGRESS NOTES
Physical Therapy Initial Evaluation  Subjective:  The history is provided by the patient.   Patient Health History  Sudarshan Alonzo being seen for R shoulder.     Problem began: 2/10/2023.   Problem occurred: Pain with workouts- lateral raises, push ups   Pain is reported as 1/10 on pain scale.  General health as reported by patient is fair.  Pertinent medical history includes: overweight, sleep disorder/apnea and depression.   Red flags:  Changes in bowel and bladder habits.             Current occupation  [employer].   Primary job tasks include:  Computer work and prolonged sitting.   Other job/home tasks details: Workouts on his own- weight machines.                Therapist Generated HPI Evaluation         Type of problem:  Right shoulder.    This is a new condition.  Condition occurred with:  Repetition/overuse.  Where condition occurred: during recreation/sport.  Patient reports pain:  Lateral.  Pain is described as sharp and is intermittent.  Pain radiates to:  Shoulder. Pain timing: activity dependent.  Since onset symptoms are gradually improving.  Symptoms are exacerbated by using arm at shoulder level, using arm behind back and using arm overhead  and relieved by rest.                              Objective:  Standing Alignment:    Cervical/Thoracic:  Forward head  Shoulder/UE:  Rounded shoulders                                       Shoulder Evaluation:  ROM:  AROM:    Flexion:  Left:  Wnl    Right:  Wnl    Abduction:  Left: wnl   Right:  End range stiffness                Flexion/External Rotation:  Left:  T2    Right:  T2 with tightness  Extension/Internal Rotation:  Left:  T2    Right:  T7 with winging          Strength:  : gross planar strength B, pain with resisted Abd, ER on R.                            Mobility Tests:      Glenohumeral posterior right:  Hypomobile      Scapulothoracic right:  Hypomobile                                       General     ROS    Assessment/Plan:     Patient is a 27 year old male with right side shoulder complaints.    Patient has the following significant findings with corresponding treatment plan.                Diagnosis 1:  R mechanical shoulder pain    Pain -  hot/cold therapy, manual therapy, self management, education and home program  Decreased ROM/flexibility - manual therapy, therapeutic exercise and home program  Decreased joint mobility - manual therapy, therapeutic exercise and home program  Decreased strength - therapeutic exercise, therapeutic activities and home program  Impaired posture - neuro re-education, therapeutic activities and home program    Therapy Evaluation Codes:   Cumulative Therapy Evaluation is: Low complexity.    Previous and current functional limitations:  (See Goal Flow Sheet for this information)    Short term and Long term goals: (See Goal Flow Sheet for this information)     Communication ability:  Patient appears to be able to clearly communicate and understand verbal and written communication and follow directions correctly.  Treatment Explanation - The following has been discussed with the patient:   RX ordered/plan of care  Anticipated outcomes  Possible risks and side effects  This patient would benefit from PT intervention to resume normal activities.   Rehab potential is good.    Frequency:  1 X week, once daily  Duration:  for 4 weeks tapering to 2 X a month over 6 weeks  Discharge Plan:  Achieve all LTG.  Independent in home treatment program.  Return to previous functional level by discharge.  Reach maximal therapeutic benefit.    Please refer to the daily flowsheet for treatment today, total treatment time and time spent performing 1:1 timed codes.

## 2023-02-24 NOTE — PROGRESS NOTES
Physical Therapy Initial Evaluation  Subjective:    Patient Health History             Pertinent medical history includes: depression, overweight and sleep disorder/apnea.   Red flags:  Changes in bowel and bladder habits.  Medical allergies: none.   Surgeries include:  None.    Current medications:  Anti-depressants.    Current occupation is [employer].   Primary job tasks include:  Computer work and prolonged sitting.                                    Objective:  System    Physical Exam    General     ROS    Assessment/Plan:

## 2023-03-07 ENCOUNTER — THERAPY VISIT (OUTPATIENT)
Dept: PHYSICAL THERAPY | Facility: CLINIC | Age: 28
End: 2023-03-07
Payer: COMMERCIAL

## 2023-03-07 DIAGNOSIS — M25.511 ACUTE PAIN OF RIGHT SHOULDER: Primary | ICD-10-CM

## 2023-03-07 PROCEDURE — 97110 THERAPEUTIC EXERCISES: CPT | Mod: GP | Performed by: PHYSICAL THERAPIST

## 2023-03-07 PROCEDURE — 97112 NEUROMUSCULAR REEDUCATION: CPT | Mod: GP | Performed by: PHYSICAL THERAPIST

## 2023-03-14 ENCOUNTER — THERAPY VISIT (OUTPATIENT)
Dept: PHYSICAL THERAPY | Facility: CLINIC | Age: 28
End: 2023-03-14
Payer: COMMERCIAL

## 2023-03-14 DIAGNOSIS — M25.511 ACUTE PAIN OF RIGHT SHOULDER: Primary | ICD-10-CM

## 2023-03-14 PROCEDURE — 97112 NEUROMUSCULAR REEDUCATION: CPT | Mod: GP | Performed by: PHYSICAL THERAPIST

## 2023-03-14 PROCEDURE — 97110 THERAPEUTIC EXERCISES: CPT | Mod: GP | Performed by: PHYSICAL THERAPIST

## 2023-09-06 ENCOUNTER — TELEPHONE (OUTPATIENT)
Dept: UROLOGY | Facility: CLINIC | Age: 28
End: 2023-09-06
Payer: COMMERCIAL

## 2023-09-06 NOTE — TELEPHONE ENCOUNTER
[facility] Call Center    Phone Message    May a detailed message be left on voicemail: no     Reason for Call: Other: Patient called to see if he could get in with another provider due to current providers availability. Please call patient to let him know if this approved or not.      Action Taken: Other: [facility]    Travel Screening: Not Applicable

## 2023-09-08 ENCOUNTER — TELEPHONE (OUTPATIENT)
Dept: UROLOGY | Facility: CLINIC | Age: 28
End: 2023-09-08
Payer: COMMERCIAL

## 2023-09-08 DIAGNOSIS — N52.9 ED (ERECTILE DYSFUNCTION): ICD-10-CM

## 2023-09-08 DIAGNOSIS — N52.9 ERECTILE DYSFUNCTION, UNSPECIFIED ERECTILE DYSFUNCTION TYPE: ICD-10-CM

## 2023-09-11 NOTE — TELEPHONE ENCOUNTER
Tried to reach pt to work in with his current provider, Dr. Edward. He is due for yearly exam/ med refill appt. Was not able to leave [facility]

## 2023-09-13 RX ORDER — TADALAFIL 20 MG/1
20 TABLET ORAL DAILY
Qty: 90 TABLET | Refills: 0 | Status: SHIPPED | OUTPATIENT
Start: 2023-09-13 | End: 2024-02-05

## 2023-09-13 NOTE — TELEPHONE ENCOUNTER
[facility] Call Center    Phone Message    May a detailed message be left on voicemail: yes     Reason for Call: Medication Refill Request    Has the patient contacted the pharmacy for the refill? Yes   Name of medication being requested: tadalafil (CIALIS) 20 MG tablet  Provider who prescribed the medication: Rashel Edward  Pharmacy: [id number] - [place], [place] - [address]  Date medication is needed: asap, out for 4 days  Please call to confirm if it will be filled.      Action Taken: Message routed to:  Other: [facility]    Travel Screening: Not Applicable

## 2023-09-22 ENCOUNTER — NURSE TRIAGE (OUTPATIENT)
Dept: NURSING | Facility: CLINIC | Age: 28
End: 2023-09-22

## 2023-09-22 NOTE — TELEPHONE ENCOUNTER
Nurse Triage SBAR    Is this a 2nd Level Triage? No     Situation: Red irritated crusty eye    Background/Assessment:     Pt reporting, a bug flew into the Pt's right eye yesterday.    It was in the Pt's right eye for about an hour and half.        Pt finally pulled down his lower right eye lid and pulled the bug out of his right eye.    Pt's eye was very red, irritated, and painful after the incident.      Today reporting, Pt woke up with a crusty discharge, red, irritated, painful eye.  It still feels like something is in the Pt's right eye.           Pt can see out of his right eye.   But still feels like there is something in the Pt's right eye.    I suggested Urgent Care as there were no visit in the eye clinic for this afternoon.    Pt will find an Urgent Care today for the right eye issue.    No further action needed at this time.    Karena Vicente RN  Central Triage Red Flags/Med Refills        Protocol Recommended Disposition:   See in Office Today   Urgent Care today       Reason for Disposition   Patient wants to be seen    Additional Information   Negative: Eye exposure to chemical or fumes   Negative: Redness of white of eye (sclera), but no pus or only a small amount of brief pus   Negative: SEVERE pain (e.g., excruciating)   Negative: Patient sounds very sick or weak to the triager   Negative: MODERATE eye pain (e.g., interferes with normal activities)   Negative: Looking at light causes MODERATE to SEVERE eye pain (i.e., photophobia)   Negative: Blurred vision   Negative: Cloudy spot or sore seen on the cornea (clear part of the eye)   Negative: Eyelids are very swollen (shut or almost)   Negative: Eyelid (outer) is very red and painful (or tender to touch)   Negative: Fever > 103 F (39.4 C)   Negative: MILD eye pain or discomfort and wears contacts   Negative: Discharge from penis   Negative: New or abnormal vaginal discharge   Negative: Using antibiotic eyedrops > 3 days but pus persists   Negative:  Lots of yellow or green nasal discharge   Negative: Weak immune system (e.g., HIV positive, cancer chemo, splenectomy, organ transplant, chronic steroids)    Protocols used: Eye - Pus or [id number]

## 2023-10-02 ENCOUNTER — TELEPHONE (OUTPATIENT)
Dept: UROLOGY | Facility: CLINIC | Age: 28
End: 2023-10-02

## 2023-10-02 ENCOUNTER — VIRTUAL VISIT (OUTPATIENT)
Dept: UROLOGY | Facility: CLINIC | Age: 28
End: 2023-10-02
Payer: COMMERCIAL

## 2023-10-02 DIAGNOSIS — R39.15 URINARY URGENCY: ICD-10-CM

## 2023-10-02 DIAGNOSIS — N52.9 ERECTILE DYSFUNCTION, UNSPECIFIED ERECTILE DYSFUNCTION TYPE: Primary | ICD-10-CM

## 2023-10-02 DIAGNOSIS — R35.0 URINARY FREQUENCY: ICD-10-CM

## 2023-10-02 DIAGNOSIS — N32.81 OAB (OVERACTIVE BLADDER): ICD-10-CM

## 2023-10-02 PROCEDURE — 99214 OFFICE O/P EST MOD 30 MIN: CPT | Mod: VID | Performed by: PHYSICIAN ASSISTANT

## 2023-10-02 RX ORDER — ALFUZOSIN HYDROCHLORIDE 10 MG/1
10 TABLET, EXTENDED RELEASE ORAL DAILY
Qty: 90 TABLET | Refills: 4 | Status: SHIPPED | OUTPATIENT
Start: 2023-10-02 | End: 2024-02-05

## 2023-10-02 RX ORDER — TADALAFIL 20 MG/1
20 TABLET ORAL EVERY 24 HOURS
Qty: 90 TABLET | Refills: 4 | Status: SHIPPED | OUTPATIENT
Start: 2023-10-02

## 2023-10-02 RX ORDER — SOLIFENACIN SUCCINATE 10 MG/1
10 TABLET, FILM COATED ORAL DAILY
Qty: 90 TABLET | Refills: 4 | Status: SHIPPED | OUTPATIENT
Start: 2023-10-02 | End: 2024-02-05

## 2023-10-02 NOTE — PROGRESS NOTES
"[facility]  CHIEF COMPLAINT   It was my pleasure to see Sudarshan Alonzo who is a very pleasant 28 year old male for follow-up of LUTS, OAB (over-active bladder).      HPI   Sudarshan Alonzo is a very pleasant 28 year old male    Initially seen by Twyla CASTRO - 3/3/2022:  \"HPI: Mr. Sudarshan Alonzo is a 26M with PMH significant for anxiety and depression who has also had mixed urinary symptoms since the end of college , ~5 years ago.  His brother and his father are urologists, both out[place]of[place]state.  His brother has offered informal advice and given him a trial of alfuzosin, but Sudarshan has never had a formal urology visit.     - Taking alfuzosin - without it, he really needs to push.  He once strained so hard that he fainted.  Now that he is on alfuzosin it has really helped.  But even on alfuzosin, his stream is weak, although he doesn't need to push quite as hard.    - Drinks a ton of water (more than 4L) and drinks 20-40 oz of caffeine per day, but still feels like he has terrible frequency even despite this.  Plus, he has always been a big water drinker. Nocturia x 12, the other night. Also has urgency if he drinks a lot of water quickly - feels sensation of needing to void at the tip of the penis. Never has had incontinence.    - Started Vesicare 10mg this week (from his brother).  Feels like it is helping.  Now gets up three times per night.    - Void frequently because he develops bladder discomfort. Discomfort resolves after he voids.  Never any pain   - Sometimes has dysuria - once per month - when he pees thick cloudy liquid that looks like ejaculate.    - AUA SS today 26 (2,5,3,4,5,3,5) \"unhappy\"  - No hematuria since a single episode when he was a child.   He became very dehydrated while playing tennis and thinks hematuria was caused by transient renal dysfunction.      - Unsure of cause for these symptoms.  His brother thinks an STD may have caused some scarring in the urethra.  Sudarshan has never " tested positive for an STD.  Did once have a partner with HPV.    - Is athletic but never recalls any  injury  - He tested himself for DM - negative.   - On Zoloft --> impacts sexual function.  Has a girlfriend, uses Cialis to counter effects of Zoloft.  Using 20mg.    - Bowels: sometimes blood from the rectum (on the stool or dripping) - has had this on and off for a year.  Has upcoming colonoscopy scheduled to evaluate this.     6/20/2022:  Positive for Ureaplasma on 3/3/22 - treated with Doxycycline 100mg BID for 7 days  No notable change with this    He drinks a lot water and coffee  He drinks 1-2 large cups of coffee    He did a sleep study last week given his concern for possible sleep apnea  He should have the results of this later this week    Dad and brother are practicing urologist in [place]     9/14/2022:  Follow-up today for cystoscopy  Cystoscopic findings:  The urethra was normal without strictures.  The prostate was 2cm long and demonstrated mild bilobar hypertrophy.  There was no median lobe.  The external sphincter coapted normally and the bladder neck was slightly high. The bladder was  entered and careful pan endoscopy was carried out. The posterior, superior and lateral walls and dome of the bladder were all well visualized and the scope was retroflexed upon itself..  There was no trabeculation.  There were no neoplasms, stones, or diverticula identifed.  The ureteric orifices were normal in position and number and effluxing clear urine.  He has continued on his alfuzosin and Vesicare  He did run out of the alfuzosin and it took a few days to get a refill and he did note a decrease in his urine stream    AUASS: 7-8-9-3-5-4-5 = 28  QOL = 5  PVR = 26cc    TODAY 10/2/23  Nocturia x 3 on alfuzosin and Vesicare. When he tried to come off of Vesicare (due to SE), he was up x12.   He uses Cialis successfully for ED.   Brother mentioned desmopressin as an alternative therapy  Hx  anxiety/depression    PHYSICAL EXAM  Patient is a 28 year old  male   General Appearance Adult:   Alert, no acute distress, oriented  HENT: throat/mouth:normal, good dentition  Lungs: no respiratory distress, or pursed lip breathing  Heart: No obvious jugular venous distension present  Abdomen: non - distended  Musculoskeltal: extremities normal, no peripheral edema  Skin: no suspicious lesions or rashes  Neuro: Alert, oriented, speech and mentation normal  Psych: affect and mood normal  Gait: Normal  : 3/3/22      Inguinal: no hernias or palpable lymph nodes      circumcised penis, no penile plaques or lesions. Has a raised mottled nontender mole (since birth) right shaft.       Orthotopic location of the urethral meatus.       Scrotum normal - tiny lesion anterior scrotal skin - fleshcolored, nontender      Testicles of normal firmness and consistency, no masses.      Epididymes bilaterally without masses or tenderness.         Vas and cord normal bilaterally.    LABS:  2/23/22 - UA - negative  2/23/22 - GC/CT - negative  2/23/22 - treponema - negative  2/23/22 - glucose - 89mg/dL    UA RESULTS:  Recent Labs   Lab Test 09/14/22  1334 02/23/22  1511   COLOR Yellow Yellow   APPEARANCE Clear Clear   URINEGLC Negative Negative   URINEBILI Negative Negative   URINEKETONE Negative Negative   SG 1.015 1.020   UBLD Negative Negative   URINEPH 6.5 6.0   PROTEIN Negative Negative   UROBILINOGEN 0.2  --    NITRITE Negative Negative   LEUKEST Negative Negative   RBCU  --  1   WBCU  --  <1        UCx:  3/3/22: Ureaplasma positive    UROFLOW 3/3/22:  Flow was completely interrupted with short bursts of emptying that occured only with straining.  Qmax was 14.2mL/s with average flow of 3.9mL/s. Voids 181cc with PVR 110cc,  PVR: Residual urine by ultrasound was 100-120 ml.        ASSESSMENT and PLAN  28-year-old man with long history of LUTS, OAB, nocturia, anxiety and depression with erectile dysfunction    LUTS and OAB with  nocturia  - We discussed the pathophysiology of the bladder and the prostate and the impacts of irritants on LUTS. Encouraged journaling to identify triggers.   - We discussed the impacts of sleep apnea.  He underwent an at home sleep study with was inconclusive and has minimal WARREN but awakens 30+ times.   - He has been on alfuzosin and Vesicare which have both helped.  We discussed the mechanism of action of these medications. Both were refilled today.   - Cystoscopy today with no clear evidence of bladder outlet obstruction  - We will plan for PFPT eval  -May need to consider UDS in the future or change from Vesicare to Myrbetriq.     ED secondary to depression and anxiety treatments  - He has been doing well with his current antidepressant regimen  - He is aware of the impact of these medications on erectile function  - He has been doing well on Cialis 20 mg as needed and   Consider Myles Johnson Plus for future refills.     4 mo follow-up on progress.     Time spent: 30 minutes spent on the date of the encounter doing chart review, history and exam, documentation and further activities as noted above.  This was in addition to cystoscopy time

## 2023-10-02 NOTE — LETTER
"10/2/2023       RE: Sudarshan Alonzo  [address]  [facility] [address]     Dear Colleague,    Thank you for referring your patient, Sudarshan Alonzo, to the [facility] at [facility]. Please see a copy of my visit note below.    [place]  CHIEF COMPLAINT   It was my pleasure to see Sudarshan Alonzo who is a very pleasant 28 year old male for follow-up of LUTS, OAB (over-active bladder).      HPI   Sudarshan Alonzo is a very pleasant 28 year old male    Initially seen by Twyla CASTRO - 3/3/2022:  \"HPI: MrStorm Alonzo is a 26M with PMH significant for anxiety and depression who has also had mixed urinary symptoms since the end of college , ~5 years ago.  His brother and his father are urologists, both out-of-state.  His brother has offered informal advice and given him a trial of alfuzosin, but Sudarshan has never had a formal urology visit.     - Taking alfuzosin - without it, he really needs to push.  He once strained so hard that he fainted.  Now that he is on alfuzosin it has really helped.  But even on alfuzosin, his stream is weak, although he doesn't need to push quite as hard.    - Drinks a ton of water (more than 4L) and drinks 20-40 oz of caffeine per day, but still feels like he has terrible frequency even despite this.  Plus, he has always been a big water drinker. Nocturia x 12, the other night. Also has urgency if he drinks a lot of water quickly - feels sensation of needing to void at the tip of the penis. Never has had incontinence.    - Started Vesicare 10mg this week (from his brother).  Feels like it is helping.  Now gets up three times per night.    - Void frequently because he develops bladder discomfort. Discomfort resolves after he voids.  Never any pain   - Sometimes has dysuria - once per month - when he pees thick cloudy liquid that looks like ejaculate.    - AUA SS today 26 (2,5,3,4,5,3,5) " "\"unhappy\"  - No hematuria since a single episode when he was a child.   He became very dehydrated while playing tennis and thinks hematuria was caused by transient renal dysfunction.      - Unsure of cause for these symptoms.  His brother thinks an STD may have caused some scarring in the urethra.  Sudarshan has never tested positive for an STD.  Did once have a partner with HPV.    - Is athletic but never recalls any  injury  - He tested himself for DM - negative.   - On Zoloft --> impacts sexual function.  Has a girlfriend, uses Cialis to counter effects of Zoloft.  Using 20mg.    - Bowels: sometimes blood from the rectum (on the stool or dripping) - has had this on and off for a year.  Has upcoming colonoscopy scheduled to evaluate this.     6/20/2022:  Positive for Ureaplasma on 3/3/22 - treated with Doxycycline 100mg BID for 7 days  No notable change with this    He drinks a lot water and coffee  He drinks 1-2 large cups of coffee    He did a sleep study last week given his concern for possible sleep apnea  He should have the results of this later this week    Dad and brother are practicing urologist in [place]     9/14/2022:  Follow-up today for cystoscopy  Cystoscopic findings:  The urethra was normal without strictures.  The prostate was 2cm long and demonstrated mild bilobar hypertrophy.  There was no median lobe.  The external sphincter coapted normally and the bladder neck was slightly high. The bladder was  entered and careful pan endoscopy was carried out. The posterior, superior and lateral walls and dome of the bladder were all well visualized and the scope was retroflexed upon itself..  There was no trabeculation.  There were no neoplasms, stones, or diverticula identifed.  The ureteric orifices were normal in position and number and effluxing clear urine.  He has continued on his alfuzosin and Vesicare  He did run out of the alfuzosin and it took a few days to get a refill and he did note a decrease " in his urine stream    AUASS: 8-9-0-3-5-4-5 = 28  QOL = 5  PVR = 26cc    TODAY 10/2/23  Nocturia x 3 on alfuzosin and Vesicare. When he tried to come off of Vesicare (due to SE), he was up x12.   He uses Cialis successfully for ED.   Brother mentioned desmopressin as an alternative therapy  Hx anxiety/depression    PHYSICAL EXAM  Patient is a 28 year old  male   General Appearance Adult:   Alert, no acute distress, oriented  HENT: throat/mouth:normal, good dentition  Lungs: no respiratory distress, or pursed lip breathing  Heart: No obvious jugular venous distension present  Abdomen: non - distended  Musculoskeltal: extremities normal, no peripheral edema  Skin: no suspicious lesions or rashes  Neuro: Alert, oriented, speech and mentation normal  Psych: affect and mood normal  Gait: Normal  : 3/3/22      Inguinal: no hernias or palpable lymph nodes      circumcised penis, no penile plaques or lesions. Has a raised mottled nontender mole (since birth) right shaft.       Orthotopic location of the urethral meatus.       Scrotum normal - tiny lesion anterior scrotal skin - fleshcolored, nontender      Testicles of normal firmness and consistency, no masses.      Epididymes bilaterally without masses or tenderness.         Vas and cord normal bilaterally.    LABS:  2/23/22 - UA - negative  2/23/22 - GC/CT - negative  2/23/22 - treponema - negative  2/23/22 - glucose - 89mg/dL    UA RESULTS:  Recent Labs   Lab Test 09/14/22  1334 02/23/22  1511   COLOR Yellow Yellow   APPEARANCE Clear Clear   URINEGLC Negative Negative   URINEBILI Negative Negative   URINEKETONE Negative Negative   SG 1.015 1.020   UBLD Negative Negative   URINEPH 6.5 6.0   PROTEIN Negative Negative   UROBILINOGEN 0.2  --    NITRITE Negative Negative   LEUKEST Negative Negative   RBCU  --  1   WBCU  --  <1        UCx:  3/3/22: Ureaplasma positive    UROFLOW 3/3/22:  Flow was completely interrupted with short bursts of emptying that occured only with  straining.  Qmax was 14.2mL/s with average flow of 3.9mL/s. Voids 181cc with PVR 110cc,  PVR: Residual urine by ultrasound was 100-120 ml.        ASSESSMENT and PLAN  28-year-old man with long history of LUTS, OAB, nocturia, anxiety and depression with erectile dysfunction    LUTS and OAB with nocturia  - We discussed the pathophysiology of the bladder and the prostate and the impacts of irritants on LUTS. Encouraged journaling to identify triggers.   - We discussed the impacts of sleep apnea.  He underwent an at home sleep study with was inconclusive and has minimal WARREN but awakens 30+ times.   - He has been on alfuzosin and Vesicare which have both helped.  We discussed the mechanism of action of these medications. Both were refilled today.   - Cystoscopy today with no clear evidence of bladder outlet obstruction  - We will plan for PFPT eval  -May need to consider UDS in the future or change from Vesicare to Myrbetriq.     ED secondary to depression and anxiety treatments  - He has been doing well with his current antidepressant regimen  - He is aware of the impact of these medications on erectile function  - He has been doing well on Cialis 20 mg as needed and   Consider Myles Espinal for future refills.     4 mo follow-up on progress.     Time spent: 30 minutes spent on the date of the encounter doing chart review, history and exam, documentation and further activities as noted above.  This was in addition to cystoscopy time

## 2023-10-02 NOTE — PATIENT INSTRUCTIONS
Office number: M-F 8am-4:30pm [phone]    Limit consumption after 5pm    Below is a list of things that can irritate the bladder and should be eliminated:    Caffeinated soft drinks.  Coffee.  Tea.  Chocolate.  Tomato-based foods.  Acidic juices and fruits. (includes cranberry juice)  Alcohol.  Nicotine  Carbonated drinks.  Aspartame/Nutrasweet.  __________________________________________    Please see one of the dedicated pelvic floor physical therapists ([facility] [phone]).    Please be aware that coverage of these services is subject to the terms and limitations of your health insurance plan.  Call member services at your health plan with any benefit or coverage questions.      Please bring the following to your appointment:    *Your personal calendar for scheduling future appointments  *Comfortable clothing

## 2023-10-02 NOTE — TELEPHONE ENCOUNTER
Health Call Center    Phone Message    May a detailed message be left on voicemail: yes     Reason for Call: Medication Question or concern regarding medication   Prescription Clarification  Name of Medication: tadalafil (ADCIRCA/CIALIS) 20 MG tablet   Prescribing Provider: Michelle   Pharmacy:    [id number] - [place], [place] - [address]    What on the order needs clarification? [facility] Pharmacists called to speak with the care team regarding this medication. He states the patient received a 90 day supply on 9/15/23, and it is noted in his chart he may be diverting this medication. He will be profiling the patients record. Please contact Walter at [phone] to discuss if continuing medication or would like to cancel. Thank you.    Action Taken: Message routed to:  Other: Uro    Travel Screening: Not Applicable

## 2023-11-26 DIAGNOSIS — N32.81 OAB (OVERACTIVE BLADDER): ICD-10-CM

## 2023-11-28 RX ORDER — SOLIFENACIN SUCCINATE 10 MG/1
10 TABLET, FILM COATED ORAL DAILY
Qty: 90 TABLET | Refills: 3 | Status: SHIPPED | OUTPATIENT
Start: 2023-11-28 | End: 2024-02-05

## 2023-12-14 DIAGNOSIS — N52.9 ERECTILE DYSFUNCTION, UNSPECIFIED ERECTILE DYSFUNCTION TYPE: ICD-10-CM

## 2023-12-15 RX ORDER — TADALAFIL 20 MG/1
20 TABLET ORAL DAILY
Qty: 90 TABLET | Refills: 0 | OUTPATIENT
Start: 2023-12-15

## 2023-12-20 NOTE — PROGRESS NOTES
DISCHARGE SUMMARY    Sudarshan Alonzo was seen 3 times for evaluation and treatment.  Patient did not return for further treatment and current status is unknown.  Due to short treatment duration, no objective or functional changes were made.  Please see goal flow sheet from episode noted date below and initial evaluation for further information.  Patient is discharged from therapy and therapy episode is resolved as of 12/20/23.      Linked Episodes   Type: Episode: Status: Noted: Resolved: Last update: Updated by:   PHYSICAL THERAPY R shoulder [id number] Active 2/24/2023  3/14/2023 10:48 AM Robyn Rick PT      Comments:

## 2023-12-20 NOTE — TELEPHONE ENCOUNTER
DIAGNOSIS: RIGHT shoulder pain / Referred by a DC, pt will bring information in / [facility]IND / Possible imaging done at the Chiro office, pt will bring information with him, pt stated     APPOINTMENT DATE: 12.21.23   NOTES STATUS DETAILS   OFFICE NOTE from referring provider Internal 12.18.23, 2.10.23  [place]  FP   MEDICATION LIST Internal

## 2023-12-21 ENCOUNTER — ANCILLARY PROCEDURE (OUTPATIENT)
Dept: GENERAL RADIOLOGY | Facility: CLINIC | Age: 28
End: 2023-12-21
Attending: FAMILY MEDICINE
Payer: COMMERCIAL

## 2023-12-21 ENCOUNTER — OFFICE VISIT (OUTPATIENT)
Dept: ORTHOPEDICS | Facility: CLINIC | Age: 28
End: 2023-12-21
Payer: COMMERCIAL

## 2023-12-21 ENCOUNTER — PRE VISIT (OUTPATIENT)
Dept: ORTHOPEDICS | Facility: CLINIC | Age: 28
End: 2023-12-21

## 2023-12-21 DIAGNOSIS — M25.511 RIGHT SHOULDER PAIN, UNSPECIFIED CHRONICITY: ICD-10-CM

## 2023-12-21 DIAGNOSIS — M25.511 RIGHT SHOULDER PAIN, UNSPECIFIED CHRONICITY: Primary | ICD-10-CM

## 2023-12-21 DIAGNOSIS — M75.81 TENDINITIS OF RIGHT ROTATOR CUFF: Primary | ICD-10-CM

## 2023-12-21 PROCEDURE — 73030 X-RAY EXAM OF SHOULDER: CPT | Mod: RT | Performed by: RADIOLOGY

## 2023-12-21 PROCEDURE — 99203 OFFICE O/P NEW LOW 30 MIN: CPT | Mod: GC | Performed by: FAMILY MEDICINE

## 2023-12-21 NOTE — LETTER
12/21/2023      RE: Sudarshan Alonzo  [address]  [facility] [address]     Dear Colleague,    Thank you for referring your patient, Sudarshan Alonzo, to the [facility] SP[facility]TS [facility]ICINE CLIN[facility] [place]. Please see a copy of my visit note below.    ASSESSMENT/PLAN:    (M75.81) Tendinitis of right rotator cuff  (primary encounter diagnosis)  Comment: exam, imaging consistent w/ RTC tendonitis/ impingement; will trial PT; f/up in 6 wks; if no better, consider further imaging; precautions/ anticipatory guidance given  Plan: Physical Therapy Referral          Attestation:  This patient has been seen and evaluated by me, Drew Castanon MD with the resident, Dr Franklin and the care team. I agree with the findings and plan of care as documented in this note.    Drew Castanon MD  December 21, 2023  4:52 PM      Pt is a 28 year old male here today for:     HPI:   Right Shoulder pain:   Location: Lateral  Duration: 1 year    Injury? Inciting activity? Push ups, other shoulder exercises.     Radiation: No    Numbness/tingling? No    Weakness? Yes    Instability? No    Clicking/ catching? No   Imaging? Yes XR on 12/21/23   Treatment? Chiropractor    Not using any pain medications.  Frustrated that he can't work out due to the pain      Per last PT note on 3/14/23:  Sudarshan Alonzo was seen 3 times for evaluation and treatment.  Patient did not return for further treatment and current status is unknown.  Due to short treatment duration, no objective or functional changes were made.  Please see goal flow sheet from episode noted date below and initial evaluation for further information.  Patient is discharged from therapy and therapy episode is resolved as of 12/20/23.    Per mychart message from pt on 12/18/23:  Hi Dr. Veliz,     My shoulder hurts after a year of physical therapy and a couple chiropractor visits. Chiropractor says   glenohumeral joint slid forward approximately 1/8 inch subluxating the  PATIENT IS TO BE TRANSFERRED TO [facility], REPORT GIVEN TO ANIBAL/KAREN anterior ligaments pinching your supraspinatus and biceps tendons.      Can you recommend a doctor - an orthopedist? - who can help look into it in more depth to see if I can do something to fix it?     It s impacting my ability to work out and do some basic things.     Thanks again!    Past Medical History:   Diagnosis Date    Anxiety     Depressive disorder     Gastroesophageal reflux disease       Past Surgical History:   Procedure Laterality Date    COLONOSCOPY N/A 4/6/2022    Procedure: COLONOSCOPY, WITH BIOPSY;  Surgeon: Augustin Christy MD;  Location: [facility] OR      Current Outpatient Medications   Medication Sig Dispense Refill    alfuzosin ER (UROXATRAL) 10 MG 24 hr tablet Take 1 tablet (10 mg) by mouth daily 90 tablet 4    atomoxetine (STRATTERA) 100 MG capsule Take 100 mg by mouth daily      buPROPion (WELLBUTRIN XL) 150 MG 24 hr tablet Take 150 mg by mouth daily      finasteride (PROPECIA) 1 MG tablet Take 1 tablet (1 mg) by mouth daily (Patient not taking: Reported on 2/10/2023) 90 tablet 1    finasteride (PROSCAR) 5 MG tablet Cut tablet into 1/4th and take 1/4th by mouth daily 30 tablet 1    sertraline (ZOLOFT) 100 MG tablet 200 mg daily  0    solifenacin (VESICARE) 10 MG tablet TAKE 1 TABLET(10 MG) BY MOUTH DAILY 90 tablet 3    solifenacin (VESICARE) 10 MG tablet Take 1 tablet (10 mg) by mouth daily 90 tablet 4    tadalafil (ADCIRCA/CIALIS) 20 MG tablet Take 1 tablet (20 mg) by mouth every 24 hours 90 tablet 4    tadalafil (CIALIS) 10 MG tablet Take 1 tablet (10 mg) by mouth daily as needed (erectile dysfunction) (Patient not taking: Reported on 2/10/2023) 30 tablet 1    tadalafil (CIALIS) 20 MG tablet Take 1 tablet (20 mg) by mouth daily 90 tablet 0    triamcinolone (ARISTOCORT HP) 0.5 % external cream Apply topically 2 times daily To affected area on right arm for 2 weeks. (Patient not taking: Reported on 9/14/2022) 60 g 0      No Known Allergies   ROS:   Gen- no fevers/chills   Rheum - no  morning stiffness   Derm - no rash/ redness   Neuro - no numbness, no tingling   Remainder of ROS negative.     Exam:   There were no vitals taken for this visit.     Right Shoulder:   Inspection: asymmetry? no   ROM:   Active - forward flexion - full/ abduction - full/ int rot - symmetric/ ext rot - symmetric   Passive- forward flexion - full/ abduction - full   Strength: deltoid/supraspinatous - 5/5; infraspinatous/ teres minor - 5/5; subscapularis - 5/5   Maneuvers:   RTC - empty can - mild pain, normal strength ; painful arc - neg; lift off - neg   Impingement - [place] -neg; Neer- neg   AC - cross arm - POS but more lateral   Palpation: AC - neg; Acromion/ supraspinatus - neg; scapula - neg; bicipital groove - neg       Xray of R shoulder on December 21, 2023 at [facility] [place] - films personally reviewed with patient at time of visit     My impression: Neg for evidence of subluxation          Again, thank you for allowing me to participate in the care of your patient.      Sincerely,    Drew Castanon MD

## 2023-12-21 NOTE — PROGRESS NOTES
ASSESSMENT/PLAN:    (M75.81) Tendinitis of right rotator cuff  (primary encounter diagnosis)  Comment: exam, imaging consistent w/ RTC tendonitis/ impingement; will trial PT; f/up in 6 wks; if no better, consider further imaging; precautions/ anticipatory guidance given  Plan: Physical Therapy Referral          Attestation:  This patient has been seen and evaluated by me, Drew Castanon MD with the resident, Dr Franklin and the care team. I agree with the findings and plan of care as documented in this note.    Drew Castanon MD  December 21, 2023  4:52 PM      Pt is a 28 year old male here today for:     HPI:   Right Shoulder pain:   Location: Lateral  Duration: 1 year    Injury? Inciting activity? Push ups, other shoulder exercises.     Radiation: No    Numbness/tingling? No    Weakness? Yes    Instability? No    Clicking/ catching? No   Imaging? Yes XR on 12/21/23   Treatment? Chiropractor    Not using any pain medications.  Frustrated that he can't work out due to the pain      Per last PT note on 3/14/23:  Sudarshan Alonzo was seen 3 times for evaluation and treatment.  Patient did not return for further treatment and current status is unknown.  Due to short treatment duration, no objective or functional changes were made.  Please see goal flow sheet from episode noted date below and initial evaluation for further information.  Patient is discharged from therapy and therapy episode is resolved as of 12/20/23.    Per mychart message from pt on 12/18/23:  Hi Dr. Veliz,     My shoulder hurts after a year of physical therapy and a couple chiropractor visits. Chiropractor says   glenohumeral joint slid forward approximately 1/8 inch subluxating the anterior ligaments pinching your supraspinatus and biceps tendons.      Can you recommend a doctor - an orthopedist? - who can help look into it in more depth to see if I can do something to fix it?     It s impacting my ability to work out and do some basic things.      Thanks again!    Past Medical History:   Diagnosis Date     Anxiety      Depressive disorder      Gastroesophageal reflux disease       Past Surgical History:   Procedure Laterality Date     COLONOSCOPY N/A 4/6/2022    Procedure: COLONOSCOPY, WITH BIOPSY;  Surgeon: Augustin Christy MD;  Location: [facility] OR      Current Outpatient Medications   Medication Sig Dispense Refill     alfuzosin ER (UROXATRAL) 10 MG 24 hr tablet Take 1 tablet (10 mg) by mouth daily 90 tablet 4     atomoxetine (STRATTERA) 100 MG capsule Take 100 mg by mouth daily       buPROPion (WELLBUTRIN XL) 150 MG 24 hr tablet Take 150 mg by mouth daily       finasteride (PROPECIA) 1 MG tablet Take 1 tablet (1 mg) by mouth daily (Patient not taking: Reported on 2/10/2023) 90 tablet 1     finasteride (PROSCAR) 5 MG tablet Cut tablet into 1/4th and take 1/4th by mouth daily 30 tablet 1     sertraline (ZOLOFT) 100 MG tablet 200 mg daily  0     solifenacin (VESICARE) 10 MG tablet TAKE 1 TABLET(10 MG) BY MOUTH DAILY 90 tablet 3     solifenacin (VESICARE) 10 MG tablet Take 1 tablet (10 mg) by mouth daily 90 tablet 4     tadalafil (ADCIRCA/CIALIS) 20 MG tablet Take 1 tablet (20 mg) by mouth every 24 hours 90 tablet 4     tadalafil (CIALIS) 10 MG tablet Take 1 tablet (10 mg) by mouth daily as needed (erectile dysfunction) (Patient not taking: Reported on 2/10/2023) 30 tablet 1     tadalafil (CIALIS) 20 MG tablet Take 1 tablet (20 mg) by mouth daily 90 tablet 0     triamcinolone (ARISTOCORT HP) 0.5 % external cream Apply topically 2 times daily To affected area on right arm for 2 weeks. (Patient not taking: Reported on 9/14/2022) 60 g 0      No Known Allergies   ROS:   Gen- no fevers/chills   Rheum - no morning stiffness   Derm - no rash/ redness   Neuro - no numbness, no tingling   Remainder of ROS negative.     Exam:   There were no vitals taken for this visit.     Right Shoulder:   Inspection: asymmetry? no   ROM:   Active - forward flexion - full/  abduction - full/ int rot - symmetric/ ext rot - symmetric   Passive- forward flexion - full/ abduction - full   Strength: deltoid/supraspinatous - 5/5; infraspinatous/ teres minor - 5/5; subscapularis - 5/5   Maneuvers:   RTC - empty can - mild pain, normal strength ; painful arc - neg; lift off - neg   Impingement - [place] -neg; Neer- neg   AC - cross arm - POS but more lateral   Palpation: AC - neg; Acromion/ supraspinatus - neg; scapula - neg; bicipital groove - neg       Xray of R shoulder on December 21, 2023 at [facility] [place] - films personally reviewed with patient at time of visit     My impression: Neg for evidence of subluxation

## 2023-12-21 NOTE — PATIENT INSTRUCTIONS
[facility] Outpatient Physical Therapy Locations    To schedule an appointment please call our scheduling department at [phone]    To fax a referral to be scheduled fax to [phone]    [place]: [address], [facility]: [address] [facility]: [address], [facility]: [address], [facility]: [address], [facility]: [address] [facility]: [address] [place], [place]  [place]: [address] [facility] , [address], [facility]: [address] [facility] , [address], [facility]: [address] [facility]: [address], [facility]: [address], [facility]: [address], [facility]: [address] [facility]: [address] [facility]: [address] [facility]: [facility]: [address] [facility], [facility]: [address], [facility]: [address], [place], [place]  [place]: [address] [facility]: [address] [place] [address]: [address], [facility]: [address] [facility] [address], [place]  [address] [place]: [address], [facility]: [address], [facility]: [address] [facility] [place], [place]  [facility]: [address] [place], [place]  [facility]: [address] [place] [address]  [place]: [address] [facility] [address] [facility], [address], [facility] [address] [facility],  [facility]: [address] [facility] [place], [place]

## 2024-01-22 NOTE — PROGRESS NOTES
PHYSICAL THERAPY EVALUATION  Type of Visit: Evaluation    See electronic medical record for Abuse and Falls Screening details.    Subjective   Date of onset: 01/23/23      Sudarshan Alonzo is a 28 year old male with a right shoulder condition. Mechanism of injury: Chronic right shoulder pain for the past year - related to push ups and other shoulder exercises. Where: (home, work, MVA, community, recreation/sport, unknown, other): recreation. Location of symptoms: lateral. Pain level on number scale: 0-7/10. Quality of pain: sharp. Associated symptoms: denies numbness and tingling. Pain frequency (constant/intermittent): intermittent. Symptoms are exacerbated by: pushing, pulling, lying on side, reaching. Symptoms are relieved by: rest. Progression of symptoms since onset (same/better/worse): same. Special tests (x-ray, MRI, CT scan, EMG, bone scan): x-ray. Previous treatment: chiropractor, PT. Improvement with previous treatment: No. General health as reported by patient is good. Pertinent medical history includes:  see Epic. Medical allergies includes: see Epic. Surgical history includes: see Epic. Current medications include: see Epic. 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 Patient is (working in normal job without restrictions/working in normal job with restrictions/working in an alternate job/not working due to present treatment problem): working in normal job. Primary job tasks: computer work. Barriers at home/work: None reported by patient. Red flags: None reported by patient.     Objective   Posture    Forward Head +   Rounded Shoulders +   Scapular Winging      Shoulder AROM (* = pain) Right Left   FL WNL* WNL   ABD WNL* WNL   ER WNL* WNL   IR WNL* WNL   EXT       Weak right rotator cuff and scapular stabilizers    Special Tests Right Left   Genaro Jones     [place]     Anterior Aprehension     Posterior Apprehension     Sulcus Sign     AC Crossover     Drop-Arm     Lift-off Sign     Painful Arc +       Cervical AROM WNL throughout except right rotation, left SB = min loss and pain on right    Assessment & Plan   CLINICAL IMPRESSIONS  Medical Diagnosis: Tendinitis of right rotator cuff    Treatment Diagnosis: Tendinitis of right rotator cuff   Impression/Assessment: Patient is a 28 year old male with right shoulder complaints.  The following significant findings have been identified: Pain, Decreased ROM/flexibility, Decreased strength, Impaired muscle performance, Decreased activity tolerance, and Impaired posture. These impairments interfere with their ability to perform self care tasks, work tasks, recreational activities, and household chores as compared to previous level of function.     Clinical Decision Making (Complexity):  Clinical Presentation: Stable/Uncomplicated  Clinical Presentation Rationale: based on medical and personal factors listed in PT evaluation  Clinical Decision Making (Complexity): Low complexity    PLAN OF CARE  Treatment Interventions:  Interventions: Manual Therapy, Neuromuscular Re-education, Therapeutic Activity, Therapeutic Exercise, Self-Care/Home Management    Long Term Goals     PT Goal 1  Goal Description: Patient will have unrestricted reaching with 0/10 pain.  Rationale: to maximize safety and independence with performance of ADLs and functional tasks  Target Date: 04/16/24      Frequency of Treatment: 1x/week  Duration of Treatment: for 4 weeks tapering down to 2x/month for 8 weeks    Recommended Referrals to Other Professionals:  None  Education Assessment:   Learner/Method: Patient;No Barriers to Learning    Risks and benefits of evaluation/treatment have been explained.   Patient/Family/caregiver agrees with Plan of Care.     Evaluation Time:     PT Eval, Low Complexity Minutes (06529): 15  Signing Clinician: Compa Andrade PT

## 2024-01-23 ENCOUNTER — THERAPY VISIT (OUTPATIENT)
Dept: PHYSICAL THERAPY | Facility: CLINIC | Age: 29
End: 2024-01-23
Attending: FAMILY MEDICINE
Payer: COMMERCIAL

## 2024-01-23 ENCOUNTER — MYC MEDICAL ADVICE (OUTPATIENT)
Dept: FAMILY MEDICINE | Facility: CLINIC | Age: 29
End: 2024-01-23

## 2024-01-23 DIAGNOSIS — M75.81 TENDINITIS OF RIGHT ROTATOR CUFF: ICD-10-CM

## 2024-01-23 DIAGNOSIS — Z13.220 SCREENING FOR LIPID DISORDERS: Primary | ICD-10-CM

## 2024-01-23 PROCEDURE — 97161 PT EVAL LOW COMPLEX 20 MIN: CPT | Mod: GP | Performed by: PHYSICAL THERAPIST

## 2024-01-23 PROCEDURE — 97110 THERAPEUTIC EXERCISES: CPT | Mod: GP | Performed by: PHYSICAL THERAPIST

## 2024-01-23 PROCEDURE — 97530 THERAPEUTIC ACTIVITIES: CPT | Mod: GP | Performed by: PHYSICAL THERAPIST

## 2024-01-24 NOTE — TELEPHONE ENCOUNTER
Just lipids is fine  We can do testosterone but we'd need a reason    Perhaps he'd like to see me and discuss, then do labs after so can discuss what to do

## 2024-01-24 NOTE — TELEPHONE ENCOUNTER
Pt has an upcoming appt on 2/9/24 and wants to have the labs done prior to the appt.   He had lipid last year and he is requesting to add testosterone. Anything do you want to add?  Soon-Mi

## 2024-01-30 ENCOUNTER — THERAPY VISIT (OUTPATIENT)
Dept: PHYSICAL THERAPY | Facility: CLINIC | Age: 29
End: 2024-01-30
Payer: COMMERCIAL

## 2024-01-30 DIAGNOSIS — M75.81 TENDINITIS OF RIGHT ROTATOR CUFF: Primary | ICD-10-CM

## 2024-01-30 PROCEDURE — 97110 THERAPEUTIC EXERCISES: CPT | Mod: GP | Performed by: PHYSICAL THERAPIST

## 2024-02-05 ENCOUNTER — VIRTUAL VISIT (OUTPATIENT)
Dept: UROLOGY | Facility: CLINIC | Age: 29
End: 2024-02-05
Payer: COMMERCIAL

## 2024-02-05 DIAGNOSIS — R39.15 URINARY URGENCY: ICD-10-CM

## 2024-02-05 DIAGNOSIS — R35.0 URINARY FREQUENCY: ICD-10-CM

## 2024-02-05 DIAGNOSIS — N32.81 OAB (OVERACTIVE BLADDER): Primary | ICD-10-CM

## 2024-02-05 DIAGNOSIS — N52.9 ERECTILE DYSFUNCTION, UNSPECIFIED ERECTILE DYSFUNCTION TYPE: ICD-10-CM

## 2024-02-05 PROCEDURE — 99214 OFFICE O/P EST MOD 30 MIN: CPT | Mod: 95 | Performed by: UROLOGY

## 2024-02-05 RX ORDER — TROSPIUM CHLORIDE ER 60 MG/1
60 CAPSULE ORAL EVERY MORNING
Qty: 90 CAPSULE | Refills: 3 | Status: SHIPPED | OUTPATIENT
Start: 2024-02-05 | End: 2025-02-04

## 2024-02-05 RX ORDER — TADALAFIL 20 MG/1
20 TABLET ORAL DAILY
Qty: 90 TABLET | Refills: 0 | Status: SHIPPED | OUTPATIENT
Start: 2024-02-05

## 2024-02-05 RX ORDER — MIRABEGRON 50 MG/1
50 TABLET, EXTENDED RELEASE ORAL DAILY
Qty: 90 TABLET | Refills: 3 | Status: SHIPPED | OUTPATIENT
Start: 2024-02-05 | End: 2024-02-05

## 2024-02-05 RX ORDER — ALFUZOSIN HYDROCHLORIDE 10 MG/1
10 TABLET, EXTENDED RELEASE ORAL DAILY
Qty: 90 TABLET | Refills: 4 | Status: SHIPPED | OUTPATIENT
Start: 2024-02-05

## 2024-02-05 ASSESSMENT — PAIN SCALES - GENERAL: PAINLEVEL: NO PAIN (0)

## 2024-02-05 NOTE — LETTER
"2/5/2024       RE: Sudarshan Alonzo  [address]  [facility] [address]     Dear Colleague,    Thank you for referring your patient, Sudarshan Alonzo, to the [facility] at [facility]. Please see a copy of my visit note below.    [place]  CHIEF COMPLAINT   It was my pleasure to see Sudarshan Alonzo who is a very pleasant 28 year old male for follow-up of LUTS, OAB (over-active bladder).      HPI   Sudarshan Alonzo is a very pleasant 28 year old male    Initially seen by Twyla CASTRO - 3/3/2022:  \"HPI: MrStorm Alonzo is a 26M with PMH significant for anxiety and depression who has also had mixed urinary symptoms since the end of college , ~5 years ago.  His brother and his father are urologists, both out[place]of[place]state.  His brother has offered informal advice and given him a trial of alfuzosin, but Sudarshan has never had a formal urology visit.     - Taking alfuzosin - without it, he really needs to push.  He once strained so hard that he fainted.  Now that he is on alfuzosin it has really helped.  But even on alfuzosin, his stream is weak, although he doesn't need to push quite as hard.    - Drinks a ton of water (more than 4L) and drinks 20-40 oz of caffeine per day, but still feels like he has terrible frequency even despite this.  Plus, he has always been a big water drinker. Nocturia x 12, the other night. Also has urgency if he drinks a lot of water quickly - feels sensation of needing to void at the tip of the penis. Never has had incontinence.    - Started Vesicare 10mg this week (from his brother).  Feels like it is helping.  Now gets up three times per night.    - Void frequently because he develops bladder discomfort. Discomfort resolves after he voids.  Never any pain   - Sometimes has dysuria - once per month - when he pees thick cloudy liquid that looks like ejaculate.    - AUA SS today 26 (2,5,3,4,5,3,5) " "\"unhappy\"  - No hematuria since a single episode when he was a child.   He became very dehydrated while playing tennis and thinks hematuria was caused by transient renal dysfunction.      - Unsure of cause for these symptoms.  His brother thinks an STD may have caused some scarring in the urethra.  Sudarshan has never tested positive for an STD.  Did once have a partner with HPV.    - Is athletic but never recalls any  injury  - He tested himself for DM - negative.   - On Zoloft --> impacts sexual function.  Has a girlfriend, uses Cialis to counter effects of Zoloft.  Using 20mg.    - Bowels: sometimes blood from the rectum (on the stool or dripping) - has had this on and off for a year.  Has upcoming colonoscopy scheduled to evaluate this.     6/20/2022:  Positive for Ureaplasma on 3/3/22 - treated with Doxycycline 100mg BID for 7 days  No notable change with this    He drinks a lot water and coffee  He drinks 1-2 large cups of coffee    He did a sleep study last week given his concern for possible sleep apnea  He should have the results of this later this week    Dad and brother are practicing urologist in [place]     9/14/2022:  Follow-up today for cystoscopy  Cystoscopic findings:  The urethra was normal without strictures.  The prostate was 2cm long and demonstrated mild bilobar hypertrophy.  There was no median lobe.  The external sphincter coapted normally and the bladder neck was slightly high. The bladder was  entered and careful pan endoscopy was carried out. The posterior, superior and lateral walls and dome of the bladder were all well visualized and the scope was retroflexed upon itself..  There was no trabeculation.  There were no neoplasms, stones, or diverticula identifed.  The ureteric orifices were normal in position and number and effluxing clear urine.  He has continued on his alfuzosin and Vesicare  He did run out of the alfuzosin and it took a few days to get a refill and he did note a decrease " in his urine stream    AUASS: 6-5-6-3-5-4-5 = 28  QOL = 5  PVR = 26cc    10/2/23 with Concepción Martinez:  Nocturia x 3 on alfuzosin and Vesicare. When he tried to come off of Vesicare (due to SE), he was up x12.   He uses Cialis successfully for ED.   Brother mentioned desmopressin as an alternative therapy  Hx anxiety/depression    TODAY 2/5/2024:  Follow-up today to discuss other medication options  Vesicare is working well though he is worried about long-term cognitive side effects  He is using his CPAP  He continues to have nocturia about 3 times per night    PHYSICAL EXAM  Patient is a 28 year old  male   Vitals: There were no vitals taken for this visit.  There is no height or weight on file to calculate BMI.  General Appearance Adult:   Alert, no acute distress, oriented  Neuro: Alert, oriented, speech and mentation normal  Psych: affect and mood normal     LABS:  2/23/22 - UA - negative  2/23/22 - GC/CT - negative  2/23/22 - treponema - negative  2/23/22 - glucose - 89mg/dL    UA RESULTS:  Recent Labs   Lab Test 09/14/22  1334 02/23/22  1511   COLOR Yellow Yellow   APPEARANCE Clear Clear   URINEGLC Negative Negative   URINEBILI Negative Negative   URINEKETONE Negative Negative   SG 1.015 1.020   UBLD Negative Negative   URINEPH 6.5 6.0   PROTEIN Negative Negative   UROBILINOGEN 0.2  --    NITRITE Negative Negative   LEUKEST Negative Negative   RBCU  --  1   WBCU  --  <1        UCx:  3/3/22: Ureaplasma positive    UROFLOW 3/3/22:  Flow was completely interrupted with short bursts of emptying that occured only with straining.  Qmax was 14.2mL/s with average flow of 3.9mL/s. Voids 181cc with PVR 110cc,  PVR: Residual urine by ultrasound was 100-120 ml.        ASSESSMENT and PLAN  28-year-old man with long history of LUTS, OAB, nocturia, anxiety and depression with erectile dysfunction    LUTS and OAB with nocturia  -He has had significant improvement with OAB medication and alfuzosin  - We discussed various OAB  medication options such as trospium or Myrbetriq.  Myrbetriq is cost prohibitive  - Prescription for trospium sent to the pharmacy  - Prior cystoscopy without clear evidence of bladder outlet obstruction    ED secondary to depression and anxiety treatments  - He has been doing well with his current antidepressant regimen  - He is aware of the impact of these medications on erectile function  - He has been doing well on Cialis 20 mg as needed and refill prescription sent to the pharmacy    Plan for 1 year follow-up with GLORIA Pedroza    Time spent: 20 minutes spent on the date of the encounter doing chart review, history and exam, documentation and further activities as noted above.      Note copy attestation: the elements have been reviewed, edited as needed, and remain pertinent to today's visit.       Rashel Edward M.D.         Virtual Visit Details    Type of service:  Video Visit     Originating Location (pt. Location): Home    Distant Location (provider location):  On-site  [facility]

## 2024-02-05 NOTE — PROGRESS NOTES
"[facility]  CHIEF COMPLAINT   It was my pleasure to see Sudarshan Alonzo who is a very pleasant 28 year old male for follow-up of LUTS, OAB (over-active bladder).      HPI   Sudarshan Alonzo is a very pleasant 28 year old male    Initially seen by Twyla CASTRO - 3/3/2022:  \"HPI: Mr. Sudarshan Alonzo is a 26M with PMH significant for anxiety and depression who has also had mixed urinary symptoms since the end of college , ~5 years ago.  His brother and his father are urologists, both out-of-state.  His brother has offered informal advice and given him a trial of alfuzosin, but Sudarshan has never had a formal urology visit.     - Taking alfuzosin - without it, he really needs to push.  He once strained so hard that he fainted.  Now that he is on alfuzosin it has really helped.  But even on alfuzosin, his stream is weak, although he doesn't need to push quite as hard.    - Drinks a ton of water (more than 4L) and drinks 20-40 oz of caffeine per day, but still feels like he has terrible frequency even despite this.  Plus, he has always been a big water drinker. Nocturia x 12, the other night. Also has urgency if he drinks a lot of water quickly - feels sensation of needing to void at the tip of the penis. Never has had incontinence.    - Started Vesicare 10mg this week (from his brother).  Feels like it is helping.  Now gets up three times per night.    - Void frequently because he develops bladder discomfort. Discomfort resolves after he voids.  Never any pain   - Sometimes has dysuria - once per month - when he pees thick cloudy liquid that looks like ejaculate.    - AUA SS today 26 (2,5,3,4,5,3,5) \"unhappy\"  - No hematuria since a single episode when he was a child.   He became very dehydrated while playing tennis and thinks hematuria was caused by transient renal dysfunction.      - Unsure of cause for these symptoms.  His brother thinks an STD may have caused some scarring in the urethra.  Sudarshan has never " tested positive for an STD.  Did once have a partner with HPV.    - Is athletic but never recalls any  injury  - He tested himself for DM - negative.   - On Zoloft --> impacts sexual function.  Has a girlfriend, uses Cialis to counter effects of Zoloft.  Using 20mg.    - Bowels: sometimes blood from the rectum (on the stool or dripping) - has had this on and off for a year.  Has upcoming colonoscopy scheduled to evaluate this.     6/20/2022:  Positive for Ureaplasma on 3/3/22 - treated with Doxycycline 100mg BID for 7 days  No notable change with this    He drinks a lot water and coffee  He drinks 1-2 large cups of coffee    He did a sleep study last week given his concern for possible sleep apnea  He should have the results of this later this week    Dad and brother are practicing urologist in [place]     9/14/2022:  Follow-up today for cystoscopy  Cystoscopic findings:  The urethra was normal without strictures.  The prostate was 2cm long and demonstrated mild bilobar hypertrophy.  There was no median lobe.  The external sphincter coapted normally and the bladder neck was slightly high. The bladder was  entered and careful pan endoscopy was carried out. The posterior, superior and lateral walls and dome of the bladder were all well visualized and the scope was retroflexed upon itself..  There was no trabeculation.  There were no neoplasms, stones, or diverticula identifed.  The ureteric orifices were normal in position and number and effluxing clear urine.  He has continued on his alfuzosin and Vesicare  He did run out of the alfuzosin and it took a few days to get a refill and he did note a decrease in his urine stream    AUASS: 9-5-2-3-5-4-5 = 28  QOL = 5  PVR = 26cc    10/2/23 with Concepción Martinez:  Nocturia x 3 on alfuzosin and Vesicare. When he tried to come off of Vesicare (due to SE), he was up x12.   He uses Cialis successfully for ED.   Brother mentioned desmopressin as an alternative therapy  Hx  anxiety/depression    TODAY 2/5/2024:  Follow-up today to discuss other medication options  Vesicare is working well though he is worried about long-term cognitive side effects  He is using his CPAP  He continues to have nocturia about 3 times per night    PHYSICAL EXAM  Patient is a 28 year old  male   Vitals: There were no vitals taken for this visit.  There is no height or weight on file to calculate BMI.  General Appearance Adult:   Alert, no acute distress, oriented  Neuro: Alert, oriented, speech and mentation normal  Psych: affect and mood normal     LABS:  2/23/22 - UA - negative  2/23/22 - GC/CT - negative  2/23/22 - treponema - negative  2/23/22 - glucose - 89mg/dL    UA RESULTS:  Recent Labs   Lab Test 09/14/22  1334 02/23/22  1511   COLOR Yellow Yellow   APPEARANCE Clear Clear   URINEGLC Negative Negative   URINEBILI Negative Negative   URINEKETONE Negative Negative   SG 1.015 1.020   UBLD Negative Negative   URINEPH 6.5 6.0   PROTEIN Negative Negative   UROBILINOGEN 0.2  --    NITRITE Negative Negative   LEUKEST Negative Negative   RBCU  --  1   WBCU  --  <1        UCx:  3/3/22: Ureaplasma positive    UROFLOW 3/3/22:  Flow was completely interrupted with short bursts of emptying that occured only with straining.  Qmax was 14.2mL/s with average flow of 3.9mL/s. Voids 181cc with PVR 110cc,  PVR: Residual urine by ultrasound was 100-120 ml.        ASSESSMENT and PLAN  28-year-old man with long history of LUTS, OAB, nocturia, anxiety and depression with erectile dysfunction    LUTS and OAB with nocturia  -He has had significant improvement with OAB medication and alfuzosin  - We discussed various OAB medication options such as trospium or Myrbetriq.  Myrbetriq is cost prohibitive  - Prescription for trospium sent to the pharmacy  - Prior cystoscopy without clear evidence of bladder outlet obstruction    ED secondary to depression and anxiety treatments  - He has been doing well with his current  antidepressant regimen  - He is aware of the impact of these medications on erectile function  - He has been doing well on Cialis 20 mg as needed and refill prescription sent to the pharmacy    Plan for 1 year follow-up with GLORIA Pedroza    Time spent: 20 minutes spent on the date of the encounter doing chart review, history and exam, documentation and further activities as noted above.      Note copy attestation: the elements have been reviewed, edited as needed, and remain pertinent to today's visit.       Rashel Edward M.D.         Virtual Visit Details    Type of service:  Video Visit     Originating Location (pt. Location): Home    Distant Location (provider location):  On-site  Platform [facility] for Video Visit[facility]

## 2024-02-05 NOTE — NURSING NOTE
Pt is roomed/ready for Doximity link sent to pt mobile [phone] when ready for visit. Pt is prepared/ready for Dox link    Is the patient currently in the state of [place]? YES    Visit mode:VIDEO    If the visit is dropped, the patient can be reconnected by: VIDEO VISIT: Text to cell phone:   Telephone Information:   Mobile [phone]       Will anyone else be joining the visit? NO  (If patient encounters technical issues they should call [phone]    How would you like to obtain your AVS? MyChart    Are changes needed to the allergy or medication list? No    Reason for visit: RECHECK (6 week follow-up to discuss options )    Melodie ALVARADO

## 2024-02-06 ENCOUNTER — THERAPY VISIT (OUTPATIENT)
Dept: PHYSICAL THERAPY | Facility: CLINIC | Age: 29
End: 2024-02-06
Attending: FAMILY MEDICINE
Payer: COMMERCIAL

## 2024-02-06 DIAGNOSIS — M75.81 TENDINITIS OF RIGHT ROTATOR CUFF: Primary | ICD-10-CM

## 2024-02-06 PROCEDURE — 97110 THERAPEUTIC EXERCISES: CPT | Mod: GP | Performed by: PHYSICAL THERAPIST

## 2024-02-09 ENCOUNTER — OFFICE VISIT (OUTPATIENT)
Dept: FAMILY MEDICINE | Facility: CLINIC | Age: 29
End: 2024-02-09
Payer: COMMERCIAL

## 2024-02-09 ENCOUNTER — LAB (OUTPATIENT)
Dept: LAB | Facility: CLINIC | Age: 29
End: 2024-02-09
Payer: COMMERCIAL

## 2024-02-09 VITALS
WEIGHT: 232 LBS | BODY MASS INDEX: 30.75 KG/M2 | HEIGHT: 73 IN | DIASTOLIC BLOOD PRESSURE: 72 MMHG | SYSTOLIC BLOOD PRESSURE: 111 MMHG | HEART RATE: 82 BPM | OXYGEN SATURATION: 99 %

## 2024-02-09 DIAGNOSIS — R53.83 FATIGUE, UNSPECIFIED TYPE: ICD-10-CM

## 2024-02-09 DIAGNOSIS — Z00.00 HEALTH CARE MAINTENANCE: ICD-10-CM

## 2024-02-09 DIAGNOSIS — Z00.00 HEALTH CARE MAINTENANCE: Primary | ICD-10-CM

## 2024-02-09 DIAGNOSIS — F32.A DEPRESSIVE DISORDER: ICD-10-CM

## 2024-02-09 LAB
ALBUMIN SERPL BCG-MCNC: 4.6 G/DL (ref 3.5–5.2)
ALP SERPL-CCNC: 67 U/L (ref 40–150)
ALT SERPL W P-5'-P-CCNC: 16 U/L (ref 0–70)
ANION GAP SERPL CALCULATED.3IONS-SCNC: 9 MMOL/L (ref 7–15)
AST SERPL W P-5'-P-CCNC: 16 U/L (ref 0–45)
BASOPHILS # BLD AUTO: 0 10E3/UL (ref 0–0.2)
BASOPHILS NFR BLD AUTO: 0 %
BILIRUB SERPL-MCNC: 0.2 MG/DL
BUN SERPL-MCNC: 13.8 MG/DL (ref 6–20)
CALCIUM SERPL-MCNC: 9.9 MG/DL (ref 8.6–10)
CHLORIDE SERPL-SCNC: 102 MMOL/L (ref 98–107)
CHOLEST SERPL-MCNC: 180 MG/DL
CREAT SERPL-MCNC: 1.15 MG/DL (ref 0.67–1.17)
DEPRECATED HCO3 PLAS-SCNC: 30 MMOL/L (ref 22–29)
EGFRCR SERPLBLD CKD-EPI 2021: 88 ML/MIN/1.73M2
EOSINOPHIL # BLD AUTO: 0.2 10E3/UL (ref 0–0.7)
EOSINOPHIL NFR BLD AUTO: 2 %
ERYTHROCYTE [DISTWIDTH] IN BLOOD BY AUTOMATED COUNT: 13.1 % (ref 10–15)
FASTING STATUS PATIENT QL REPORTED: NO
GLUCOSE SERPL-MCNC: 91 MG/DL (ref 70–99)
HCT VFR BLD AUTO: 44.1 % (ref 40–53)
HDLC SERPL-MCNC: 42 MG/DL
HGB BLD-MCNC: 14.9 G/DL (ref 13.3–17.7)
IMM GRANULOCYTES # BLD: 0 10E3/UL
IMM GRANULOCYTES NFR BLD: 0 %
LDLC SERPL CALC-MCNC: 72 MG/DL
LYMPHOCYTES # BLD AUTO: 2.1 10E3/UL (ref 0.8–5.3)
LYMPHOCYTES NFR BLD AUTO: 24 %
MCH RBC QN AUTO: 28.2 PG (ref 26.5–33)
MCHC RBC AUTO-ENTMCNC: 33.8 G/DL (ref 31.5–36.5)
MCV RBC AUTO: 84 FL (ref 78–100)
MONOCYTES # BLD AUTO: 1 10E3/UL (ref 0–1.3)
MONOCYTES NFR BLD AUTO: 12 %
NEUTROPHILS # BLD AUTO: 5.3 10E3/UL (ref 1.6–8.3)
NEUTROPHILS NFR BLD AUTO: 62 %
NONHDLC SERPL-MCNC: 138 MG/DL
NRBC # BLD AUTO: 0 10E3/UL
NRBC BLD AUTO-RTO: 0 /100
PLATELET # BLD AUTO: 289 10E3/UL (ref 150–450)
POTASSIUM SERPL-SCNC: 4.2 MMOL/L (ref 3.4–5.3)
PROT SERPL-MCNC: 7.6 G/DL (ref 6.4–8.3)
RBC # BLD AUTO: 5.28 10E6/UL (ref 4.4–5.9)
SODIUM SERPL-SCNC: 141 MMOL/L (ref 135–145)
TRIGL SERPL-MCNC: 332 MG/DL
TSH SERPL DL<=0.005 MIU/L-ACNC: 1.19 UIU/ML (ref 0.3–4.2)
WBC # BLD AUTO: 8.6 10E3/UL (ref 4–11)

## 2024-02-09 PROCEDURE — 80061 LIPID PANEL: CPT | Performed by: PATHOLOGY

## 2024-02-09 PROCEDURE — 90480 ADMN SARSCOV2 VAC 1/ONLY CMP: CPT | Performed by: FAMILY MEDICINE

## 2024-02-09 PROCEDURE — 80053 COMPREHEN METABOLIC PANEL: CPT | Performed by: PATHOLOGY

## 2024-02-09 PROCEDURE — 84443 ASSAY THYROID STIM HORMONE: CPT | Performed by: PATHOLOGY

## 2024-02-09 PROCEDURE — 91320 SARSCV2 VAC 30MCG TRS-SUC IM: CPT | Performed by: FAMILY MEDICINE

## 2024-02-09 PROCEDURE — 36415 COLL VENOUS BLD VENIPUNCTURE: CPT | Performed by: PATHOLOGY

## 2024-02-09 PROCEDURE — 85025 COMPLETE CBC W/AUTO DIFF WBC: CPT | Performed by: PATHOLOGY

## 2024-02-09 PROCEDURE — 99000 SPECIMEN HANDLING OFFICE-LAB: CPT | Performed by: PATHOLOGY

## 2024-02-09 PROCEDURE — 84403 ASSAY OF TOTAL TESTOSTERONE: CPT | Performed by: FAMILY MEDICINE

## 2024-02-09 PROCEDURE — 99395 PREV VISIT EST AGE 18-39: CPT | Mod: 25 | Performed by: FAMILY MEDICINE

## 2024-02-09 RX ORDER — BUSPIRONE HYDROCHLORIDE 5 MG/1
1 TABLET ORAL
COMMUNITY
Start: 2024-01-18

## 2024-02-09 ASSESSMENT — ANXIETY QUESTIONNAIRES
5. BEING SO RESTLESS THAT IT IS HARD TO SIT STILL: NEARLY EVERY DAY
7. FEELING AFRAID AS IF SOMETHING AWFUL MIGHT HAPPEN: NOT AT ALL
GAD7 TOTAL SCORE: 16
1. FEELING NERVOUS, ANXIOUS, OR ON EDGE: NEARLY EVERY DAY
3. WORRYING TOO MUCH ABOUT DIFFERENT THINGS: NEARLY EVERY DAY
GAD7 TOTAL SCORE: 16
2. NOT BEING ABLE TO STOP OR CONTROL WORRYING: NEARLY EVERY DAY
IF YOU CHECKED OFF ANY PROBLEMS ON THIS QUESTIONNAIRE, HOW DIFFICULT HAVE THESE PROBLEMS MADE IT FOR YOU TO DO YOUR WORK, TAKE CARE OF THINGS AT HOME, OR GET ALONG WITH OTHER PEOPLE: SOMEWHAT DIFFICULT
6. BECOMING EASILY ANNOYED OR IRRITABLE: SEVERAL DAYS

## 2024-02-09 ASSESSMENT — PATIENT HEALTH QUESTIONNAIRE - PHQ9
5. POOR APPETITE OR OVEREATING: NEARLY EVERY DAY
SUM OF ALL RESPONSES TO PHQ QUESTIONS 1-9: 15

## 2024-02-09 NOTE — PROGRESS NOTES
"Sudarshan is a 28 year old that presents in clinic today for the following:     Chief Complaint   Patient presents with    Physical           2/9/2024    12:30 PM   Additional Questions   Roomed by Bentley Mckinney     Screenings from encounters over the past 10 days    No data recorded       Bentley Mckinney at 12:39 PM on 2/9/2024    Assessment & Plan     Health care maintenance  Due  - Lipid panel reflex to direct LDL Fasting; Future  - COVID-19 12+ (2023-24) (PFIZER)    Fatigue, unspecified type  Cont w/ mental health team  - CBC with platelets and differential; Future  - TSH with free T4 reflex; Future  - Comprehensive metabolic panel (BMP + Alb, Alk Phos, ALT, AST, Total. Bili, TP); Future  - Testosterone total; Future    Depressive disorder  He is very happy w/ current therapist, requests referral to as helps ins costs  - Adult Mental Health  Referral; Future      35 minutes spent by me on the date of the encounter doing chart review, history and exam, documentation and further activities per the note      BMI  Estimated body mass index is 30.22 kg/m  as calculated from the following:    Height as of this encounter: 1.866 m (6' 1.47\").    Weight as of this encounter: 105.2 kg (232 lb).   Weight management plan: Discussed healthy diet and exercise guidelines    Depression Screening Follow Up        2/9/2024    12:47 PM   PHQ   PHQ-9 Total Score 15   Q9: Thoughts of better off dead/self-harm past 2 weeks Not at all       Follow Up Actions Taken  Crisis resource information provided in After Visit Summary  Referred patient back to current mental health provider.           No follow-ups on file.    Subjective   Sudarshan is a 28 year old, presenting for the following health issues:  Physical      2/9/2024    12:30 PM   Additional Questions   Roomed by Bentley Mckinney     HPI   Ok w/ covid not flu shot  Defers need for std tests  Fatigue, chronic  Sees psychiatrist elsewhere  No acute c/o  Mom possibly mental " health history    Past Medical History:   Diagnosis Date    Anxiety     Depressive disorder     Gastroesophageal reflux disease      Past Surgical History:   Procedure Laterality Date    COLONOSCOPY N/A 2022    Procedure: COLONOSCOPY, WITH BIOPSY;  Surgeon: Augustin Christy MD;  Location: [facility] OR     Current Outpatient Medications   Medication    alfuzosin ER (UROXATRAL) 10 MG 24 hr tablet    atomoxetine (STRATTERA) 100 MG capsule    buPROPion (WELLBUTRIN XL) 150 MG 24 hr tablet    busPIRone (BUSPAR) 5 MG tablet    finasteride (PROSCAR) 5 MG tablet    sertraline (ZOLOFT) 100 MG tablet    tadalafil (ADCIRCA/CIALIS) 20 MG tablet    tadalafil (CIALIS) 20 MG tablet    trospium (SANCTURA XR) 60 MG CP24 24 hr capsule    finasteride (PROPECIA) 1 MG tablet    triamcinolone (ARISTOCORT HP) 0.5 % external cream     No current facility-administered medications for this visit.     No Known Allergies  Family History   Problem Relation Age of Onset    Mental Illness Mother      Social History     Socioeconomic History    Marital status: Single     Spouse name: Not on file    Number of children: Not on file    Years of education: Not on file    Highest education level: Not on file   Occupational History    Not on file   Tobacco Use    Smoking status: Former     Packs/day: 0.00     Years: 5.00     Additional pack years: 0.00     Total pack years: 0.00     Types: Cigars, Hookah, Dip, chew, snus or snuff, Cigarettes     Start date: 2011     Quit date: 2019     Years since quittin.6    Smokeless tobacco: Former     Quit date: 2019   Substance and Sexual Activity    Alcohol use: Not Currently    Drug use: Not Currently     Types: Cocaine, Marijuana, Benzodiazepines    Sexual activity: Yes     Partners: Female     Birth control/protection: Condom   Other Topics Concern    Not on file   Social History Narrative    Not on file     Social Determinants of Health     Financial Resource Strain: Low Risk  (2024)  "   Financial Resource Strain     Within the past 12 months, have you or your family members you live with been unable to get utilities (heat, electricity) when it was really needed?: No   Food Insecurity: Low Risk  (2/9/2024)    Food Insecurity     Within the past 12 months, did you worry that your food would run out before you got money to buy more?: No     Within the past 12 months, did the food you bought just not last and you didn t have money to get more?: No   Transportation Needs: Low Risk  (2/9/2024)    Transportation Needs     Within the past 12 months, has lack of transportation kept you from medical appointments, getting your medicines, non-medical meetings or appointments, work, or from getting things that you need?: No   Physical Activity: Not on file   Stress: Not on file   Social Connections: Not on file   Interpersonal Safety: Low Risk  (2/9/2024)    Interpersonal Safety     Do you feel physically and emotionally safe where you currently live?: Yes     Within the past 12 months, have you been hit, slapped, kicked or otherwise physically hurt by someone?: No     Within the past 12 months, have you been humiliated or emotionally abused in other ways by your partner or ex-partner?: No   Housing Stability: Low Risk  (2/9/2024)    Housing Stability     Do you have housing? : Yes     Are you worried about losing your housing?: No               Objective    /72 (BP Location: Right arm, Patient Position: Sitting, Cuff Size: Adult Regular)   Pulse 82   Ht 1.866 m (6' 1.47\")   Wt 105.2 kg (232 lb)   SpO2 99%   BMI 30.22 kg/m    Body mass index is 30.22 kg/m .  Physical Exam   GENERAL: alert and no distress  EYES: Eyes grossly normal to inspection, PERRL and conjunctivae and sclerae normal  HENT: ear canals and TM's normal, nose and mouth without ulcers or lesions  NECK: no adenopathy, no asymmetry, masses, or scars  RESP: lungs clear to auscultation - no rales, rhonchi or wheezes  CV: regular rate " and rhythm, normal S1 S2, no S3 or S4, no murmur, click or rub, no peripheral edema  ABDOMEN: soft, nontender, no hepatosplenomegaly, no masses and bowel sounds normal   (male): normal male genitalia without lesions or urethral discharge, no hernia  MS: no gross musculoskeletal defects noted, no edema  SKIN: no suspicious lesions or rashes  NEURO: Normal strength and tone, mentation intact and speech normal  PSYCH: mentation appears normal, affect normal/bright            Signed Electronically by: Elio Veliz MD

## 2024-02-13 ENCOUNTER — THERAPY VISIT (OUTPATIENT)
Dept: PHYSICAL THERAPY | Facility: CLINIC | Age: 29
End: 2024-02-13
Attending: FAMILY MEDICINE
Payer: COMMERCIAL

## 2024-02-13 DIAGNOSIS — M75.81 TENDINITIS OF RIGHT ROTATOR CUFF: Primary | ICD-10-CM

## 2024-02-13 LAB — TESTOST SERPL-MCNC: 464 NG/DL (ref 240–950)

## 2024-02-13 PROCEDURE — 97110 THERAPEUTIC EXERCISES: CPT | Mod: GP | Performed by: PHYSICAL THERAPIST

## 2024-03-05 ENCOUNTER — TELEPHONE (OUTPATIENT)
Dept: UROLOGY | Facility: CLINIC | Age: 29
End: 2024-03-05

## 2024-03-05 NOTE — TELEPHONE ENCOUNTER
[facility] Call Center    Phone Message    May a detailed message be left on voicemail: no     Reason for Call: Other: Patient is calling to follow up on a PA for his medication. Please call patient back to discuss. Patient stated that he sent a note on my chart and has not heard back from the clinic.     Action Taken: Other: [facility]    Travel Screening: Not Applicable

## 2024-03-06 DIAGNOSIS — N32.81 OAB (OVERACTIVE BLADDER): Primary | ICD-10-CM

## 2024-03-06 RX ORDER — FESOTERODINE FUMARATE 4 MG/1
4 TABLET, FILM COATED, EXTENDED RELEASE ORAL DAILY
Qty: 90 TABLET | Refills: 1 | Status: SHIPPED | OUTPATIENT
Start: 2024-03-06

## 2024-03-11 ENCOUNTER — OFFICE VISIT (OUTPATIENT)
Dept: ORTHOPEDICS | Facility: CLINIC | Age: 29
End: 2024-03-11
Payer: COMMERCIAL

## 2024-03-11 DIAGNOSIS — M25.531 CHRONIC WRIST PAIN, RIGHT: ICD-10-CM

## 2024-03-11 DIAGNOSIS — G89.29 CHRONIC WRIST PAIN, RIGHT: ICD-10-CM

## 2024-03-11 DIAGNOSIS — M75.81 TENDINITIS OF RIGHT ROTATOR CUFF: Primary | ICD-10-CM

## 2024-03-11 PROCEDURE — 99213 OFFICE O/P EST LOW 20 MIN: CPT | Performed by: FAMILY MEDICINE

## 2024-03-11 NOTE — LETTER
3/11/2024      RE: Sudarshan Alonzo  [address]  [place] [address]     Dear Colleague,    Thank you for referring your patient, Sudarshan Alonzo, to the [facility] [place]. Please see a copy of my visit note below.    ASSESSMENT/PLAN:    (M75.81) Tendinitis of right rotator cuff  (primary encounter diagnosis)  Comment: we discussed option of restarting PT vs further imaging vs injection; he feels that he was improving before he stopped PT so he would like to try again; f/up in 6 wks; if no better, consider further imaging vs injection  Plan: Physical Therapy  Referral          (M25.531,  G89.29) Chronic wrist pain, right  Comment: overuse ulnar-sided impingement from fishing motion or casting line; will see hand therapy; f/up prn  Plan: Hand Therapy Referral          Drew Castanon MD  March 11, 2024  11:23 AM      Pt is a 28 year old male last seen on 12/21/23 here for follow up of:     R shoulder pain - Has attended 4 PT visits and felt improvement in his strength. He had to travel to [place], [place] and stopped his exercises while he was away and did notice a return in his symptoms. Overall, he would say that he is about the same since his last visit.  He has also noticed some pain and weakness in his right wrist and his neck as well.   New neck symptoms   Feels like wrist is completely separate   No numbness/tingling   No weakness     No specific injury to wrist -> hurts w/ fishing over ulnar wrist; has been doing more than usual fishing     Per last PT note on 2/13/24:  Patient reports consistency with HEP. Starting to feel stronger. Still does not have confidence to do push ups so we will review today.     Per my last note:  (M75.81) Tendinitis of right rotator cuff  (primary encounter diagnosis)  Comment: exam, imaging consistent w/ RTC tendonitis/ impingement; will trial PT; f/up in 6 wks; if no better, consider further imaging; precautions/  anticipatory guidance given  Plan: Physical Therapy Referral    Past Medical History:   Diagnosis Date     Anxiety      Depressive disorder      Gastroesophageal reflux disease       Current Outpatient Medications   Medication Sig Dispense Refill     alfuzosin ER (UROXATRAL) 10 MG 24 hr tablet Take 1 tablet (10 mg) by mouth daily 90 tablet 4     atomoxetine (STRATTERA) 100 MG capsule Take 100 mg by mouth daily       buPROPion (WELLBUTRIN XL) 150 MG 24 hr tablet Take 150 mg by mouth daily       busPIRone (BUSPAR) 5 MG tablet Take 1 tablet by mouth 2 times daily       fesoterodine fumarate (TOVIAZ) 4 MG TB24 24 hr tablet Take 1 tablet (4 mg) by mouth daily 90 tablet 1     finasteride (PROPECIA) 1 MG tablet Take 1 tablet (1 mg) by mouth daily (Patient not taking: Reported on 2/10/2023) 90 tablet 1     finasteride (PROSCAR) 5 MG tablet Cut tablet into 1/4th and take 1/4th by mouth daily 30 tablet 1     sertraline (ZOLOFT) 100 MG tablet 200 mg daily  0     tadalafil (ADCIRCA/CIALIS) 20 MG tablet Take 1 tablet (20 mg) by mouth every 24 hours 90 tablet 4     tadalafil (CIALIS) 20 MG tablet Take 1 tablet (20 mg) by mouth daily 90 tablet 0     triamcinolone (ARISTOCORT HP) 0.5 % external cream Apply topically 2 times daily To affected area on right arm for 2 weeks. (Patient not taking: Reported on 9/14/2022) 60 g 0     trospium (SANCTURA XR) 60 MG CP24 24 hr capsule Take 1 capsule (60 mg) by mouth every morning 90 capsule 3      No Known Allergies     ROS:   Gen- no fevers/chills   Rheum - no morning stiffness   Derm - no rash/ redness   Neuro - no numbness, no tingling   Remainder of ROS negative.     Exam:     R shoulder:  Full ROM  Strength 5/5  Pain w/ resisted ER/Abd  Neg empty can  Pos impingement signs     R wrist:  +TTP at ulnar fovea  Neg TFCC grind  No pain w/ resisted wrist flexion/ extension        Again, thank you for allowing me to participate in the care of your patient.      Sincerely,    Drew Castanon MD

## 2024-03-11 NOTE — PROGRESS NOTES
ASSESSMENT/PLAN:    (M75.81) Tendinitis of right rotator cuff  (primary encounter diagnosis)  Comment: we discussed option of restarting PT vs further imaging vs injection; he feels that he was improving before he stopped PT so he would like to try again; f/up in 6 wks; if no better, consider further imaging vs injection  Plan: Physical Therapy  Referral          (M25.531,  G89.29) Chronic wrist pain, right  Comment: overuse ulnar-sided impingement from fishing motion or casting line; will see hand therapy; f/up prn  Plan: Hand Therapy Referral          Drew Castanon MD  March 11, 2024  11:23 AM      Pt is a 28 year old male last seen on 12/21/23 here for follow up of:     R shoulder pain - Has attended 4 PT visits and felt improvement in his strength. He had to travel to [place], [place] and stopped his exercises while he was away and did notice a return in his symptoms. Overall, he would say that he is about the same since his last visit.  He has also noticed some pain and weakness in his right wrist and his neck as well.   New neck symptoms   Feels like wrist is completely separate   No numbness/tingling   No weakness     No specific injury to wrist -> hurts w/ fishing over ulnar wrist; has been doing more than usual fishing     Per last PT note on 2/13/24:  Patient reports consistency with HEP. Starting to feel stronger. Still does not have confidence to do push ups so we will review today.     Per my last note:  (M75.81) Tendinitis of right rotator cuff  (primary encounter diagnosis)  Comment: exam, imaging consistent w/ RTC tendonitis/ impingement; will trial PT; f/up in 6 wks; if no better, consider further imaging; precautions/ anticipatory guidance given  Plan: Physical Therapy Referral    Past Medical History:   Diagnosis Date     Anxiety      Depressive disorder      Gastroesophageal reflux disease       Current Outpatient Medications   Medication Sig Dispense Refill     alfuzosin ER (UROXATRAL) 10  MG 24 hr tablet Take 1 tablet (10 mg) by mouth daily 90 tablet 4     atomoxetine (STRATTERA) 100 MG capsule Take 100 mg by mouth daily       buPROPion (WELLBUTRIN XL) 150 MG 24 hr tablet Take 150 mg by mouth daily       busPIRone (BUSPAR) 5 MG tablet Take 1 tablet by mouth 2 times daily       fesoterodine fumarate (TOVIAZ) 4 MG TB24 24 hr tablet Take 1 tablet (4 mg) by mouth daily 90 tablet 1     finasteride (PROPECIA) 1 MG tablet Take 1 tablet (1 mg) by mouth daily (Patient not taking: Reported on 2/10/2023) 90 tablet 1     finasteride (PROSCAR) 5 MG tablet Cut tablet into 1/4th and take 1/4th by mouth daily 30 tablet 1     sertraline (ZOLOFT) 100 MG tablet 200 mg daily  0     tadalafil (ADCIRCA/CIALIS) 20 MG tablet Take 1 tablet (20 mg) by mouth every 24 hours 90 tablet 4     tadalafil (CIALIS) 20 MG tablet Take 1 tablet (20 mg) by mouth daily 90 tablet 0     triamcinolone (ARISTOCORT HP) 0.5 % external cream Apply topically 2 times daily To affected area on right arm for 2 weeks. (Patient not taking: Reported on 9/14/2022) 60 g 0     trospium (SANCTURA XR) 60 MG CP24 24 hr capsule Take 1 capsule (60 mg) by mouth every morning 90 capsule 3      No Known Allergies     ROS:   Gen- no fevers/chills   Rheum - no morning stiffness   Derm - no rash/ redness   Neuro - no numbness, no tingling   Remainder of ROS negative.     Exam:     R shoulder:  Full ROM  Strength 5/5  Pain w/ resisted ER/Abd  Neg empty can  Pos impingement signs     R wrist:  +TTP at ulnar fovea  Neg TFCC grind  No pain w/ resisted wrist flexion/ extension

## 2024-03-12 ENCOUNTER — THERAPY VISIT (OUTPATIENT)
Dept: PHYSICAL THERAPY | Facility: CLINIC | Age: 29
End: 2024-03-12
Payer: COMMERCIAL

## 2024-03-12 DIAGNOSIS — M75.81 TENDINITIS OF RIGHT ROTATOR CUFF: ICD-10-CM

## 2024-03-12 PROCEDURE — 97110 THERAPEUTIC EXERCISES: CPT | Mod: GP | Performed by: PHYSICAL THERAPIST

## 2024-05-06 DIAGNOSIS — R35.0 URINARY FREQUENCY: ICD-10-CM

## 2024-05-06 DIAGNOSIS — R39.15 URINARY URGENCY: ICD-10-CM

## 2024-05-06 DIAGNOSIS — N32.81 OAB (OVERACTIVE BLADDER): ICD-10-CM

## 2024-05-10 RX ORDER — ALFUZOSIN HYDROCHLORIDE 10 MG/1
1 TABLET, EXTENDED RELEASE ORAL DAILY
Qty: 90 TABLET | Refills: 4 | OUTPATIENT
Start: 2024-05-10

## 2024-05-18 DIAGNOSIS — N32.81 OAB (OVERACTIVE BLADDER): ICD-10-CM

## 2024-05-20 RX ORDER — FESOTERODINE FUMARATE 4 MG/1
4 TABLET, FILM COATED, EXTENDED RELEASE ORAL DAILY
Qty: 90 TABLET | Refills: 1 | OUTPATIENT
Start: 2024-05-20

## 2024-06-06 ENCOUNTER — MYC MEDICAL ADVICE (OUTPATIENT)
Dept: FAMILY MEDICINE | Facility: CLINIC | Age: 29
End: 2024-06-06
Payer: COMMERCIAL

## 2024-07-10 ENCOUNTER — OFFICE VISIT (OUTPATIENT)
Dept: FAMILY MEDICINE | Facility: CLINIC | Age: 29
End: 2024-07-10
Payer: COMMERCIAL

## 2024-07-10 ENCOUNTER — LAB (OUTPATIENT)
Dept: LAB | Facility: CLINIC | Age: 29
End: 2024-07-10
Payer: COMMERCIAL

## 2024-07-10 VITALS
HEART RATE: 82 BPM | WEIGHT: 221 LBS | OXYGEN SATURATION: 98 % | DIASTOLIC BLOOD PRESSURE: 76 MMHG | BODY MASS INDEX: 28.79 KG/M2 | SYSTOLIC BLOOD PRESSURE: 113 MMHG | TEMPERATURE: 98 F

## 2024-07-10 DIAGNOSIS — F42.8 OTHER OBSESSIVE-COMPULSIVE DISORDERS: ICD-10-CM

## 2024-07-10 DIAGNOSIS — F33.1 MODERATE EPISODE OF RECURRENT MAJOR DEPRESSIVE DISORDER (H): ICD-10-CM

## 2024-07-10 DIAGNOSIS — G24.9 DYSKINESIA: ICD-10-CM

## 2024-07-10 DIAGNOSIS — F41.9 ANXIETY: ICD-10-CM

## 2024-07-10 DIAGNOSIS — R25.1 TREMOR: Primary | ICD-10-CM

## 2024-07-10 DIAGNOSIS — T88.7XXA MEDICATION SIDE EFFECTS: ICD-10-CM

## 2024-07-10 DIAGNOSIS — Z11.59 NEED FOR HEPATITIS C SCREENING TEST: Primary | ICD-10-CM

## 2024-07-10 LAB
ALBUMIN SERPL BCG-MCNC: 4.7 G/DL (ref 3.5–5.2)
ALP SERPL-CCNC: 70 U/L (ref 40–150)
ALT SERPL W P-5'-P-CCNC: 8 U/L (ref 0–70)
ANION GAP SERPL CALCULATED.3IONS-SCNC: 9 MMOL/L (ref 7–15)
AST SERPL W P-5'-P-CCNC: 17 U/L (ref 0–45)
BASOPHILS # BLD AUTO: 0 10E3/UL (ref 0–0.2)
BASOPHILS NFR BLD AUTO: 1 %
BILIRUB SERPL-MCNC: 0.3 MG/DL
BUN SERPL-MCNC: 14.1 MG/DL (ref 6–20)
CALCIUM SERPL-MCNC: 9.8 MG/DL (ref 8.6–10)
CHLORIDE SERPL-SCNC: 105 MMOL/L (ref 98–107)
CK SERPL-CCNC: 110 U/L (ref 39–308)
CREAT SERPL-MCNC: 1.1 MG/DL (ref 0.67–1.17)
DEPRECATED HCO3 PLAS-SCNC: 26 MMOL/L (ref 22–29)
EGFRCR SERPLBLD CKD-EPI 2021: >90 ML/MIN/1.73M2
EOSINOPHIL # BLD AUTO: 0.3 10E3/UL (ref 0–0.7)
EOSINOPHIL NFR BLD AUTO: 4 %
ERYTHROCYTE [DISTWIDTH] IN BLOOD BY AUTOMATED COUNT: 13.2 % (ref 10–15)
GLUCOSE SERPL-MCNC: 102 MG/DL (ref 70–99)
HCT VFR BLD AUTO: 45.1 % (ref 40–53)
HCV AB SERPL QL IA: NONREACTIVE
HGB BLD-MCNC: 14.8 G/DL (ref 13.3–17.7)
IMM GRANULOCYTES # BLD: 0 10E3/UL
IMM GRANULOCYTES NFR BLD: 0 %
LYMPHOCYTES # BLD AUTO: 1.7 10E3/UL (ref 0.8–5.3)
LYMPHOCYTES NFR BLD AUTO: 25 %
MCH RBC QN AUTO: 27.8 PG (ref 26.5–33)
MCHC RBC AUTO-ENTMCNC: 32.8 G/DL (ref 31.5–36.5)
MCV RBC AUTO: 85 FL (ref 78–100)
MONOCYTES # BLD AUTO: 0.6 10E3/UL (ref 0–1.3)
MONOCYTES NFR BLD AUTO: 9 %
NEUTROPHILS # BLD AUTO: 4.3 10E3/UL (ref 1.6–8.3)
NEUTROPHILS NFR BLD AUTO: 61 %
NRBC # BLD AUTO: 0 10E3/UL
NRBC BLD AUTO-RTO: 0 /100
PLATELET # BLD AUTO: 290 10E3/UL (ref 150–450)
POTASSIUM SERPL-SCNC: 4.8 MMOL/L (ref 3.4–5.3)
PROT SERPL-MCNC: 7.8 G/DL (ref 6.4–8.3)
RBC # BLD AUTO: 5.33 10E6/UL (ref 4.4–5.9)
SODIUM SERPL-SCNC: 140 MMOL/L (ref 135–145)
WBC # BLD AUTO: 7 10E3/UL (ref 4–11)

## 2024-07-10 PROCEDURE — 85025 COMPLETE CBC W/AUTO DIFF WBC: CPT | Performed by: PATHOLOGY

## 2024-07-10 PROCEDURE — 99215 OFFICE O/P EST HI 40 MIN: CPT | Performed by: FAMILY MEDICINE

## 2024-07-10 PROCEDURE — 93000 ELECTROCARDIOGRAM COMPLETE: CPT | Performed by: FAMILY MEDICINE

## 2024-07-10 PROCEDURE — 99000 SPECIMEN HANDLING OFFICE-LAB: CPT | Performed by: PATHOLOGY

## 2024-07-10 PROCEDURE — 80053 COMPREHEN METABOLIC PANEL: CPT | Performed by: PATHOLOGY

## 2024-07-10 PROCEDURE — 36415 COLL VENOUS BLD VENIPUNCTURE: CPT | Performed by: PATHOLOGY

## 2024-07-10 PROCEDURE — 82550 ASSAY OF CK (CPK): CPT | Performed by: PATHOLOGY

## 2024-07-10 PROCEDURE — 99417 PROLNG OP E/M EACH 15 MIN: CPT | Performed by: FAMILY MEDICINE

## 2024-07-10 PROCEDURE — 86803 HEPATITIS C AB TEST: CPT | Performed by: FAMILY MEDICINE

## 2024-07-10 ASSESSMENT — ANXIETY QUESTIONNAIRES
5. BEING SO RESTLESS THAT IT IS HARD TO SIT STILL: NOT AT ALL
IF YOU CHECKED OFF ANY PROBLEMS ON THIS QUESTIONNAIRE, HOW DIFFICULT HAVE THESE PROBLEMS MADE IT FOR YOU TO DO YOUR WORK, TAKE CARE OF THINGS AT HOME, OR GET ALONG WITH OTHER PEOPLE: VERY DIFFICULT
GAD7 TOTAL SCORE: 13
2. NOT BEING ABLE TO STOP OR CONTROL WORRYING: MORE THAN HALF THE DAYS
1. FEELING NERVOUS, ANXIOUS, OR ON EDGE: MORE THAN HALF THE DAYS
7. FEELING AFRAID AS IF SOMETHING AWFUL MIGHT HAPPEN: SEVERAL DAYS
3. WORRYING TOO MUCH ABOUT DIFFERENT THINGS: NEARLY EVERY DAY
6. BECOMING EASILY ANNOYED OR IRRITABLE: MORE THAN HALF THE DAYS
GAD7 TOTAL SCORE: 13

## 2024-07-10 ASSESSMENT — PATIENT HEALTH QUESTIONNAIRE - PHQ9
5. POOR APPETITE OR OVEREATING: NEARLY EVERY DAY
SUM OF ALL RESPONSES TO PHQ QUESTIONS 1-9: 15

## 2024-07-10 NOTE — PATIENT INSTRUCTIONS
Please review your medications with your psychiatrist as they interact contributing to risk of serration syndrome and tremor is likely related consider reduction in Bupropion and sertraline  as first steps also discontinue Buspar.

## 2024-07-10 NOTE — PROGRESS NOTES
Assessment & Plan     Tremor  Medication side effects  Dyskinesia  He has bilateral hand tremor and dyskinesias feelings of decreased coordination likely these are all medication side effects related to multiple interactions of his medications increasing levels.  Would recommend consideration of  lowering bupropion to 150 mg, next would be to lower sertraline to 100 mg and as he has not noted BuSpar to be effective to discontinue.  He will contact his psychiatry provider to review concern for medication interaction contributing to dyskinesia and medication side effects.  His neurological exam is otherwise normal.  I reviewed most often dyskinesia will improve with adjust dose.  I reviewed his EKG looks normal no signs of prolonged QT at this time I provided him a copy to share with his psychiatrist.Advised against substance use  - CBC with platelets and differential  - Comprehensive metabolic panel (BMP + Alb, Alk Phos, ALT, AST, Total. Bili, TP)  - CK total  - REVIEW OF HEALTH MAINTENANCE PROTOCOL ORDERS  - EKG 12-lead complete w/read - Clinics    Other obsessive-compulsive disorders  Moderate episode of recurrent major depressive disorder (H).    Anxiety  He has an appointment this Friday with his psychiatrist see above.        55 minutes spent on the date of the encounter doing chart review, history, exam, diagnostics review, documentation, evaluation & management, counseling and coordination of cares as noted exclusive of EKG reading.          Rasheed Heaton is a 28 year old, presenting for the following health issues:  Follow Up (Hands shaking/tremors for years per pt, been getting worst within the last year per pt and balding per pt onset about couple years ago )        7/10/2024     9:13 AM   Additional Questions   Roomed by MR     History of Present Illness       Reason for visit:  Shakiness and balding  Symptom onset:  More than a month  Symptom intensity:  Mild  Symptom progression:  Staying the  same  Had these symptoms before:  Yes  Has tried/received treatment for these symptoms:  Yes  Previous treatment was successful:  Yes  Prior treatment description:  Finasteride for hair    He eats 2-3 servings of fruits and vegetables daily.He consumes 0 sweetened beverage(s) daily.He exercises with enough effort to increase his heart rate 20 to 29 minutes per day.  He exercises with enough effort to increase his heart rate 4 days per week.   He is taking medications regularly.   Sudarshan Alonzo 28 with history of primary obsessional OCD, recurrent major depressive disorder, anxiety, ADHD presents for evaluation of tremor feeling shakiness throughout his body but noticing mostly in the bilateral hands.  He also has noted more coordination concerns, does not feel his strength is as much as previously.  He currently is following with a psychiatrist outside clinic who is managing his medications as noted below.  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  Psychiatry recently added BuSpar which he does not think has been effective.  He has been on doses of sertraline 200 mg daily, bupropion  mg daily, Strattera 100 mg daily in addition to Cialis 20 mg daily, Toviaz 4 mg and currently not taking finasteride.  There may be a family history of tremor, there is a family history of mental health concerns, family HX parkinsons reviewed today.  No other known neurological concerns.       Social History     Social History Narrative    From [place] currently works at [employer].    Broke up with Girlfriend 3 months ago 4/2024 dated for three years.  Has been exploring psychedelic mushroom use, smokes cigars twice per year minimal alcohol use less than 1 drink per week, no vaping.  No other current substance use.    BP Readings from Last 3 Encounters:   07/10/24 113/76   02/09/24 111/72   02/10/23 114/75    Wt Readings from  Last 3 Encounters:   07/10/24 100.2 kg (221 lb)   24 105.2 kg (232 lb)   02/10/23 100.3 kg (221 lb 1.6 oz)                  Patient Active Problem List   Diagnosis    Depressive disorder    Tendinitis of right rotator cuff     Past Surgical History:   Procedure Laterality Date    COLONOSCOPY N/A 2022    Procedure: COLONOSCOPY, WITH BIOPSY;  Surgeon: Augustin Christy MD;  Location: [facility] OR       Social History     Tobacco Use    Smoking status: Former     Current packs/day: 0.00     Types: Cigars, Dip, chew, snus or snuff, Cigarettes     Start date: 2011     Quit date: 2019     Years since quittin.0    Smokeless tobacco: Former     Quit date: 2019   Substance Use Topics    Alcohol use: Yes     Comment: one per week or less     Family History   Problem Relation Age of Onset    Mental Illness Mother         Borderline    Depression Mother     Anxiety Disorder Mother     Skin Cancer Father     Arrhythmia Father     Hypertension Father     Attention Deficit Disorder Father     Personality Disorder Father     Tremor Father     GI problems Brother     Attention Deficit Disorder Brother     Hypertension Brother     Attention Deficit Disorder Brother     Parkinsonism Paternal Grandmother          Current Outpatient Medications   Medication Sig Dispense Refill    alfuzosin ER (UROXATRAL) 10 MG 24 hr tablet Take 1 tablet (10 mg) by mouth daily 90 tablet 4    atomoxetine (STRATTERA) 100 MG capsule Take 100 mg by mouth daily      buPROPion (WELLBUTRIN XL) 150 MG 24 hr tablet Take 150 mg by mouth daily      busPIRone (BUSPAR) 5 MG tablet Take 1 tablet by mouth 2 times daily      fesoterodine fumarate (TOVIAZ) 4 MG TB24 24 hr tablet Take 1 tablet (4 mg) by mouth daily 90 tablet 1    sertraline (ZOLOFT) 100 MG tablet 200 mg daily  0    tadalafil (ADCIRCA/CIALIS) 20 MG tablet Take 1 tablet (20 mg) by mouth every 24 hours 90 tablet 4    tadalafil (CIALIS) 20 MG tablet Take 1 tablet (20 mg) by mouth  daily 90 tablet 0    triamcinolone (ARISTOCORT HP) 0.5 % external cream Apply topically 2 times daily To affected area on right arm for 2 weeks. 60 g 0    trospium (SANCTURA XR) 60 MG CP24 24 hr capsule Take 1 capsule (60 mg) by mouth every morning 90 capsule 3    finasteride (PROPECIA) 1 MG tablet Take 1 tablet (1 mg) by mouth daily (Patient not taking: Reported on 2/10/2023) 90 tablet 1    finasteride (PROSCAR) 5 MG tablet Cut tablet into 1/4th and take 1/4th by mouth daily (Patient not taking: Reported on 7/10/2024) 30 tablet 1     No Known Allergies  Recent Labs   Lab Test 07/10/24  1038 02/09/24  1355 02/17/23  1223 02/23/22  1452   LDL  --  72 107* 136*   HDL  --  42 48 51   TRIG  --  332* 78 125   ALT 8 16  --   --    CR 1.10 1.15  --  1.01   GFRESTIMATED >90 88  --  >90   POTASSIUM 4.8 4.2  --  3.9   TSH  --  1.19  --  2.56                 Review of Systems  Problem list, PMH, Surgical HX, FH, SH, allergies, medications,immunizations reviewed and updated in Epic.  ROS noted in HPI and ROS,staff / patient questionnaires reviewed by me.         Objective    /76 (BP Location: Right arm, Patient Position: Sitting, Cuff Size: Adult Regular)   Pulse 82   Temp 98  F (36.7  C)   Wt 100.2 kg (221 lb)   SpO2 98%   BMI 28.79 kg/m    Body mass index is 28.79 kg/m .  Physical Exam   GENERAL: alert and no distress, muscular, tall.  He appears intelligent and detailed.  EYES: Eyes grossly normal to inspection, PERRL, extraocular movements intact, and conjunctivae and sclerae normal  HENT: ear canals and TM's normal, palate rises symmetrically, mouth without ulcers or lesions  NECK: no adenopathy, no asymmetry, masses, or scars  RESP: lungs clear to auscultation - no rales, rhonchi or wheezes  CV: regular rate and rhythm, normal S1 S2, no S3 or S4, no murmur, click or rub, no peripheral edema  ABDOMEN: soft, nontender, no hepatosplenomegaly, no masses and bowel sounds normal  MS:  no musculoskeletal defects  noted, no edema, excellent muscle mass  SKIN: no suspicious lesions or rashes  NEURO:  CN 2-12 intact. Normal strength and tone upper and lower extremities, mentation intact and speech normal.  He has bilateral hand tremor, finger-to-nose accurate, negative pronator drift, able to do tandem walk but indicates he feels off balance.  PSYCH: mentation appears normal, intelligent, forthcoming with information but did ask questions why we needed to go through family history, detail oriented, thoughts congruent admits to concerns related to ADHD currently better managed with Strattera nonstimulant medication, admits to worry about depression.        Results for orders placed or performed in visit on 07/10/24   Comprehensive metabolic panel (BMP + Alb, Alk Phos, ALT, AST, Total. Bili, TP)     Status: Abnormal   Result Value Ref Range    Sodium 140 135 - 145 mmol/L    Potassium 4.8 3.4 - 5.3 mmol/L    Carbon Dioxide (CO2) 26 22 - 29 mmol/L    Anion Gap 9 7 - 15 mmol/L    Urea Nitrogen 14.1 6.0 - 20.0 mg/dL    Creatinine 1.10 0.67 - 1.17 mg/dL    GFR Estimate >90 >60 mL/min/1.73m2    Calcium 9.8 8.6 - 10.0 mg/dL    Chloride 105 98 - 107 mmol/L    Glucose 102 (H) 70 - 99 mg/dL    Alkaline Phosphatase 70 40 - 150 U/L    AST 17 0 - 45 U/L    ALT 8 0 - 70 U/L    Protein Total 7.8 6.4 - 8.3 g/dL    Albumin 4.7 3.5 - 5.2 g/dL    Bilirubin Total 0.3 <=1.2 mg/dL   CK total     Status: Normal   Result Value Ref Range     39 - 308 U/L   CBC with platelets and differential     Status: None   Result Value Ref Range    WBC Count 7.0 4.0 - 11.0 10e3/uL    RBC Count 5.33 4.40 - 5.90 10e6/uL    Hemoglobin 14.8 13.3 - 17.7 g/dL    Hematocrit 45.1 40.0 - 53.0 %    MCV 85 78 - 100 fL    MCH 27.8 26.5 - 33.0 pg    MCHC 32.8 31.5 - 36.5 g/dL    RDW 13.2 10.0 - 15.0 %    Platelet Count 290 150 - 450 10e3/uL    % Neutrophils 61 %    % Lymphocytes 25 %    % Monocytes 9 %    % Eosinophils 4 %    % Basophils 1 %    % Immature Granulocytes 0 %     NRBCs per 100 WBC 0 <1 /100    Absolute Neutrophils 4.3 1.6 - 8.3 10e3/uL    Absolute Lymphocytes 1.7 0.8 - 5.3 10e3/uL    Absolute Monocytes 0.6 0.0 - 1.3 10e3/uL    Absolute Eosinophils 0.3 0.0 - 0.7 10e3/uL    Absolute Basophils 0.0 0.0 - 0.2 10e3/uL    Absolute Immature Granulocytes 0.0 <=0.4 10e3/uL    Absolute NRBCs 0.0 10e3/uL   CBC with platelets and differential     Status: None    Narrative    The following orders were created for panel order CBC with platelets and differential.  Procedure                               Abnormality         Status                     ---------                               -----------         ------                     CBC with platelets and d...[[id number]]                      Final result                 Please view results for these tests on the individual orders.   Results for orders placed or performed in visit on 07/10/24   EKG 12-lead complete w/read - Clinics     Status: None (Preliminary result)   Result Value Ref Range    Systolic Blood Pressure  mmHg    Diastolic Blood Pressure  mmHg    Ventricular Rate 73 BPM    Atrial Rate 73 BPM    SD Interval 162 ms    QRS Duration 100 ms     ms    QTc 431 ms    P Axis -1 degrees    R AXIS 27 degrees    T Axis 31 degrees    Interpretation ECG       Sinus rhythm  Normal ECG  No previous ECGs available            2/10/2023     4:26 PM 2/9/2024    12:47 PM 7/10/2024     9:27 AM   PHQ   PHQ-9 Total Score 10 15 15   Q9: Thoughts of better off dead/self-harm past 2 weeks Not at all Not at all Not at all          2/10/2023     4:26 PM 2/9/2024    12:47 PM 7/10/2024     9:27 AM   FADUMO-7 SCORE   Total Score 11 16 13             EKG Interpretation:      Interpreted by Juan R Mcclain MD  Time reviewed:10:20 am  Symptoms at time of EKG: None   Rhythm: Normal sinus   Rate: 73  Axis: Normal  Ectopy: None  Conduction: Normal  ST Segments/ T Waves: No ST-T wave changes and No acute ischemic changes  Q Waves: None  Comparison to  prior: No old EKG available    Clinical Impression: normal EKG Normal QT  Signed Electronically by: Juan R Mcclain MD

## 2024-07-11 ENCOUNTER — MYC MEDICAL ADVICE (OUTPATIENT)
Dept: FAMILY MEDICINE | Facility: CLINIC | Age: 29
End: 2024-07-11
Payer: COMMERCIAL

## 2024-07-11 DIAGNOSIS — R73.9 ELEVATED RANDOM BLOOD GLUCOSE LEVEL: ICD-10-CM

## 2024-07-11 DIAGNOSIS — Z00.00 HEALTH CARE MAINTENANCE: Primary | ICD-10-CM

## 2024-07-11 DIAGNOSIS — L65.9 ALOPECIA: ICD-10-CM

## 2024-07-11 LAB
ATRIAL RATE - MUSE: 73 BPM
DIASTOLIC BLOOD PRESSURE - MUSE: NORMAL MMHG
INTERPRETATION ECG - MUSE: NORMAL
P AXIS - MUSE: -1 DEGREES
PR INTERVAL - MUSE: 162 MS
QRS DURATION - MUSE: 100 MS
QT - MUSE: 392 MS
QTC - MUSE: 431 MS
R AXIS - MUSE: 27 DEGREES
SYSTOLIC BLOOD PRESSURE - MUSE: NORMAL MMHG
T AXIS - MUSE: 31 DEGREES
VENTRICULAR RATE- MUSE: 73 BPM

## 2024-07-11 NOTE — RESULT ENCOUNTER NOTE
Your CPK muscle test, complete blood count including hemoglobin,white cell count for infection, kidney, liver, blood sugar,calcium, sodium and potassium were normal or within acceptable range.   Hep C screen for health maintenance was negative good result.  Juan R Mcclain MD

## 2024-07-11 NOTE — RESULT ENCOUNTER NOTE
Normal EKG reviewed at visit.  QT interval normal at this time.  We reviewed please discuss medication lowering adjust with your mental health prescribing provider.  Best wishes,  Juan R Mcclain MD

## 2024-08-06 ENCOUNTER — TELEPHONE (OUTPATIENT)
Dept: UROLOGY | Facility: CLINIC | Age: 29
End: 2024-08-06
Payer: COMMERCIAL

## 2024-08-07 NOTE — TELEPHONE ENCOUNTER
PA Initiation    Medication: FESOTERODINE FUMARATE ER 4 MG PO TB24  Insurance Company: [employer] ([facility]) - Phone [phone] Fax [phone]  Pharmacy Filling the Rx: [id number] - [place], [place] - [address] [place] [address]  Filling Pharmacy Phone: [phone]  Filling Pharmacy Fax: [phone]  Start Date: 8/7/2024

## 2024-08-07 NOTE — TELEPHONE ENCOUNTER
PRIOR AUTHORIZATION DENIED    Medication: FESOTERODINE FUMARATE ER 4 MG PO TB24    Insurance Company: [employer] ([facility]) - Phone [phone] Fax [phone]    Denial Date: 8/7/2024    Denial Reason(s):         Appeal Information:

## 2024-08-12 ENCOUNTER — OFFICE VISIT (OUTPATIENT)
Dept: FAMILY MEDICINE | Facility: CLINIC | Age: 29
End: 2024-08-12
Payer: COMMERCIAL

## 2024-08-12 VITALS
HEIGHT: 73 IN | SYSTOLIC BLOOD PRESSURE: 121 MMHG | RESPIRATION RATE: 17 BRPM | WEIGHT: 219.5 LBS | BODY MASS INDEX: 29.09 KG/M2 | TEMPERATURE: 98.5 F | DIASTOLIC BLOOD PRESSURE: 77 MMHG | OXYGEN SATURATION: 97 % | HEART RATE: 60 BPM

## 2024-08-12 DIAGNOSIS — R73.9 ELEVATED RANDOM BLOOD GLUCOSE LEVEL: ICD-10-CM

## 2024-08-12 DIAGNOSIS — Z00.00 HEALTH CARE MAINTENANCE: ICD-10-CM

## 2024-08-12 DIAGNOSIS — L25.9 SKIN IRRITATION FROM SHAVING: ICD-10-CM

## 2024-08-12 DIAGNOSIS — Z11.3 ROUTINE SCREENING FOR STI (SEXUALLY TRANSMITTED INFECTION): Primary | ICD-10-CM

## 2024-08-12 LAB
HBA1C MFR BLD: 5.5 %
T VAGINALIS DNA SPEC QL NAA+PROBE: NOT DETECTED

## 2024-08-12 PROCEDURE — 87491 CHLMYD TRACH DNA AMP PROBE: CPT | Mod: ORL | Performed by: NURSE PRACTITIONER

## 2024-08-12 PROCEDURE — 86780 TREPONEMA PALLIDUM: CPT | Mod: ORL | Performed by: NURSE PRACTITIONER

## 2024-08-12 PROCEDURE — 87661 TRICHOMONAS VAGINALIS AMPLIF: CPT | Mod: ORL | Performed by: NURSE PRACTITIONER

## 2024-08-12 PROCEDURE — 87389 HIV-1 AG W/HIV-1&-2 AB AG IA: CPT | Mod: ORL | Performed by: NURSE PRACTITIONER

## 2024-08-12 PROCEDURE — 83036 HEMOGLOBIN GLYCOSYLATED A1C: CPT | Mod: ORL | Performed by: FAMILY MEDICINE

## 2024-08-12 ASSESSMENT — ENCOUNTER SYMPTOMS
FEVER: 0
CHILLS: 0

## 2024-08-12 ASSESSMENT — PAIN SCALES - GENERAL: PAINLEVEL: NO PAIN (0)

## 2024-08-12 NOTE — PROGRESS NOTES
"Today's Date: Aug 12, 2024     Patient Sudarshan Alonzo 1995 presents to the clinic today to address   Chief Complaint   Patient presents with    STD     STD testing, HPV, it looks like there may be a \"flat wart\"             SUBJECTIVE     History of Present Illness:    28-year-old male presents to clinic to have the lesion examined and for STD testing.  Lesion presents on his testicles typically after he shaves.  He is not sure if the lesion is present today.  He otherwise feels fine and denies fevers, chills, penile discharge, or enlarged lymph nodes in his groin. He would like to be screened for STIs.  He has had 2 new partners over the past several weeks.  He has not been using condoms regularly. No other acute concerns/symptoms at time of exam.          Review of Systems   Constitutional:  Negative for chills and fever.   Genitourinary:  Positive for genital sores. Negative for penile discharge.   Constitutional, HEENT, cardiovascular, pulmonary, gi and gu systems are negative, except as otherwise noted.        No Known Allergies     Current Outpatient Medications   Medication Instructions    alfuzosin ER (UROXATRAL) 10 mg, Oral, DAILY    atomoxetine (STRATTERA) 100 mg, Oral, DAILY    buPROPion (WELLBUTRIN XL) 150 mg, Oral, DAILY    busPIRone (BUSPAR) 5 MG tablet 1 tablet, Oral, 2 TIMES DAILY (Diuretics and Nitrates)    fesoterodine fumarate (TOVIAZ) 4 mg, Oral, DAILY    finasteride (PROPECIA) 1 mg, Oral, DAILY    finasteride (PROSCAR) 5 MG tablet Cut tablet into 1/4th and take 1/4th by mouth daily    sertraline (ZOLOFT) 200 mg, DAILY    tadalafil (ADCIRCA/CIALIS) 20 mg, Oral, EVERY 24 HOURS    tadalafil (CIALIS) 20 mg, Oral, DAILY    triamcinolone (ARISTOCORT HP) 0.5 % external cream Topical, 2 TIMES DAILY, To affected area on right arm for 2 weeks.    trospium (SANCTURA XR) 60 mg, Oral, EVERY MORNING       Past Medical History:   Diagnosis Date    ADHD (attention deficit hyperactivity disorder)     " "Stopped stimulent     Anxiety     Depressive disorder     Gastroesophageal reflux disease     OCD (obsessive compulsive disorder)     [facility] [employer]        Family History   Problem Relation Age of Onset    Mental Illness Mother         Borderline    Depression Mother     Anxiety Disorder Mother     Skin Cancer Father     Arrhythmia Father     Hypertension Father     Attention Deficit Disorder Father     Personality Disorder Father     Tremor Father     GI problems Brother     Attention Deficit Disorder Brother     Hypertension Brother     Attention Deficit Disorder Brother     Parkinsonism Paternal Grandmother         Social History     Tobacco Use    Smoking status: Former     Current packs/day: 0.00     Types: Cigars, Dip, chew, snus or snuff, Cigarettes     Start date: 2011     Quit date: 2019     Years since quittin.1    Smokeless tobacco: Former     Quit date: 2019   Vaping Use    Vaping status: Never Used   Substance Use Topics    Alcohol use: Yes     Comment: one per week or less    Drug use: Not Currently     Types: Cocaine, Marijuana, Benzodiazepines     Comment: mushrooms        History   Sexual Activity    Sexual activity: Yes    Partners: Female    Birth control/ protection: Condom            2/10/2023     4:26 PM 2024    12:47 PM 7/10/2024     9:27 AM   PHQ   PHQ-9 Total Score 10 15 15   Q9: Thoughts of better off dead/self-harm past 2 weeks Not at all Not at all Not at all        Immunization History   Administered Date(s) Administered    COVID-19 12+ (-) (Pfizer) 2024    COVID-19 MONOVALENT 12+ (Pfizer) 2021, 06/10/2021, 12/15/2021    Influenza Vaccine >6 months,quad, PF 2022, 02/10/2023    TDAP Vaccine (Boostrix) 2019                 OBJECTIVE     /77 (BP Location: Left arm, Patient Position: Sitting, Cuff Size: Adult Regular)   Pulse 60   Temp 98.5  F (36.9  C) (Oral)   Resp 17   Ht 1.862 m (6' 1.3\")   Wt 99.6 kg (219 lb 8 oz)   " "SpO2 97%   BMI 28.72 kg/m       Labs:  Lab Results   Component Value Date    WBC 7.0 07/10/2024    HGB 14.8 07/10/2024    HCT 45.1 07/10/2024     07/10/2024    CHOL 180 02/09/2024    TRIG 332 (H) 02/09/2024    HDL 42 02/09/2024    ALT 8 07/10/2024    AST 17 07/10/2024     07/10/2024    BUN 14.1 07/10/2024    CO2 26 07/10/2024    TSH 1.19 02/09/2024    INR 1.04 02/23/2022        Physical Exam  Constitutional:       General: He is not in acute distress.     Appearance: He is not ill-appearing.      Comments: Patient declined chaperone.   Cardiovascular:      Rate and Rhythm: Normal rate and regular rhythm.      Heart sounds: Normal heart sounds. No murmur heard.  Pulmonary:      Effort: Pulmonary effort is normal. No respiratory distress.      Breath sounds: Normal breath sounds. No wheezing or rales.   Abdominal:      Hernia: There is no hernia in the left inguinal area or right inguinal area.   Genitourinary:     Testes:         Right: Tenderness not present.         Left: Tenderness not present.      Comments: No focal lesion noted on testicles. Patient's testicles are shaved.     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  Musculoskeletal:      Cervical back: Neck supple.   Lymphadenopathy:      Cervical: No cervical adenopathy.   Neurological:      General: No focal deficit present.      Mental Status: He is alert.   Psychiatric:         Thought Content: Thought content normal.         Judgment: Judgment normal.               ASSESSMENT/PLAN     1. Routine screening for STI (sexually transmitted infection)    Patient reports papular lesion on penile shaft has been present since birth.  No chancre noted.  Encouraged patient to use condoms. Disposition pending results.      - Chlamydia trachomatis/Neisseria gonorrhoeae by PCR - Clinic Collect  - Trichomonas vaginalis DNA PCR  - HIV Antigen Antibody Combo  - Treponema Abs w Reflex to RPR and " Titer    2. Skin irritation from shaving  Patient denies using shaving cream when he shaves his testicles.  Encourage patient to use shaving cream in the future.  Shaving less often will help as well.          Follow-Up:  - Follow up in as needed, or if symptoms worsen or fail to improve.     Options for treatment and follow-up care were reviewed with the patient. Patient engaged in the decision making process and verbalized understanding of the options discussed and agreed with the final plan.  AVS printed and given to patient.    ROJELIO Childress [facility]  Nurse Practitioners [address] [address]  [phone]        Note: Chart documentation was done in part with Dragon Voice Recognition software.  Although reviewed after completion, some word and grammatical errors may remain. Please contact author for any clarification or concerns.

## 2024-08-12 NOTE — NURSING NOTE
"ROOM:1  SLIME PAN[facility]AL    Preferred Name: Sudarshan     How did you hear about us?  Other - Referred by another clinic     Services Provided? No    28 year old  Chief Complaint   Patient presents with    STD     STD testing, HPV, it looks like there may be a \"flat wart\"       Blood pressure 121/77, pulse 60, temperature 98.5  F (36.9  C), temperature source Oral, resp. rate 17, height 1.862 m (6' 1.3\"), weight 99.6 kg (219 lb 8 oz), SpO2 97%. Body mass index is 28.72 kg/m .  BP completed using cuff size:        Patient Active Problem List   Diagnosis    Depressive disorder    Tendinitis of right rotator cuff    Other obsessive-compulsive disorders       Wt Readings from Last 2 Encounters:   08/12/24 99.6 kg (219 lb 8 oz)   07/10/24 100.2 kg (221 lb)     BP Readings from Last 3 Encounters:   08/12/24 121/77   07/10/24 113/76   02/09/24 111/72       No Known Allergies    Current Outpatient Medications   Medication Sig Dispense Refill    alfuzosin ER (UROXATRAL) 10 MG 24 hr tablet Take 1 tablet (10 mg) by mouth daily 90 tablet 4    atomoxetine (STRATTERA) 100 MG capsule Take 100 mg by mouth daily      buPROPion (WELLBUTRIN XL) 150 MG 24 hr tablet Take 150 mg by mouth daily      busPIRone (BUSPAR) 5 MG tablet Take 1 tablet by mouth 2 times daily      fesoterodine fumarate (TOVIAZ) 4 MG TB24 24 hr tablet Take 1 tablet (4 mg) by mouth daily 90 tablet 1    sertraline (ZOLOFT) 100 MG tablet 200 mg daily  0    tadalafil (ADCIRCA/CIALIS) 20 MG tablet Take 1 tablet (20 mg) by mouth every 24 hours 90 tablet 4    tadalafil (CIALIS) 20 MG tablet Take 1 tablet (20 mg) by mouth daily 90 tablet 0    triamcinolone (ARISTOCORT HP) 0.5 % external cream Apply topically 2 times daily To affected area on right arm for 2 weeks. 60 g 0    trospium (SANCTURA XR) 60 MG CP24 24 hr capsule Take 1 capsule (60 mg) by mouth every morning 90 capsule 3    finasteride (PROPECIA) 1 MG tablet Take 1 tablet (1 mg) by mouth daily (Patient not " "taking: Reported on 2/10/2023) 90 tablet 1    finasteride (PROSCAR) 5 MG tablet Cut tablet into / and take 1/4th by mouth daily (Patient not taking: Reported on 7/10/2024) 30 tablet 1     No current facility-administered medications for this visit.       Social History     Tobacco Use    Smoking status: Former     Current packs/day: 0.00     Types: Cigars, Dip, chew, snus or snuff, Cigarettes     Start date: 2011     Quit date: 2019     Years since quittin.1    Smokeless tobacco: Former     Quit date: 2019   Vaping Use    Vaping status: Never Used   Substance Use Topics    Alcohol use: Yes     Comment: one per week or less    Drug use: Not Currently     Types: Cocaine, Marijuana, Benzodiazepines     Comment: mushrooms       Honoring Choices - Health Care Directive Guide offered to patient at time of visit.    Health Maintenance Due   Topic Date Due    ADVANCE CARE PLANNING  Never done    DEPRESSION ACTION PLAN  Never done    HEPATITIS B IMMUNIZATION (1 of 3 - 19+ 3-dose series) Never done    MENTAL HEALTH TX PLAN  2020       Immunization History   Administered Date(s) Administered    COVID-19 12+ (-) (Pfizer) 2024    COVID-19 MONOVALENT 12+ (Pfizer) 2021, 06/10/2021, 12/15/2021    Influenza Vaccine >6 months,quad, PF 2022, 02/10/2023    TDAP Vaccine (Boostrix) 2019       No results found for: \"PAP\"    Recent Labs   Lab Test 07/10/24  1038 24  1355 23  1223 22  1452   LDL  --  72 107* 136*   HDL  --  42 48 51   TRIG  --  332* 78 125   ALT 8 16  --   --    CR 1.10 1.15  --  1.01   GFRESTIMATED >90 88  --  >90   ALBUMIN 4.7 4.6  --   --    POTASSIUM 4.8 4.2  --  3.9   TSH  --  1.19  --  2.56           2024     2:19 PM 7/10/2024     9:27 AM   PHQ-2 (  [employer])   Q1: Little interest or pleasure in doing things 1 3   Q2: Feeling down, depressed or hopeless 1 3   PHQ-2 Score 2 6   Q1: Little interest or pleasure in doing things Several " days    Q2: Feeling down, depressed or hopeless Several days    PHQ-2 Score 2            2/23/2022     2:23 PM 2/10/2023     4:26 PM 2/9/2024    12:47 PM 7/10/2024     9:27 AM   PHQ-9 SCORE   PHQ-9 Total Score 10 10 15 15           2/10/2023     4:26 PM 2/9/2024    12:47 PM 7/10/2024     9:27 AM   FADUMO-7 SCORE   Total Score 11 16 13            No data to display                Marly Jo, EMT    August 12, 2024 2:26 PM

## 2024-08-13 LAB
C TRACH DNA SPEC QL PROBE+SIG AMP: NEGATIVE
HIV 1+2 AB+HIV1 P24 AG SERPL QL IA: NONREACTIVE
N GONORRHOEA DNA SPEC QL NAA+PROBE: NEGATIVE
T PALLIDUM AB SER QL: NONREACTIVE

## 2024-08-21 ENCOUNTER — OFFICE VISIT (OUTPATIENT)
Dept: INTERNAL MEDICINE | Facility: CLINIC | Age: 29
End: 2024-08-21
Payer: COMMERCIAL

## 2024-08-21 VITALS
OXYGEN SATURATION: 96 % | BODY MASS INDEX: 28.4 KG/M2 | SYSTOLIC BLOOD PRESSURE: 119 MMHG | WEIGHT: 217 LBS | HEART RATE: 67 BPM | DIASTOLIC BLOOD PRESSURE: 80 MMHG

## 2024-08-21 DIAGNOSIS — L64.9 MALE PATTERN ALOPECIA: Primary | ICD-10-CM

## 2024-08-21 PROCEDURE — 99213 OFFICE O/P EST LOW 20 MIN: CPT | Performed by: INTERNAL MEDICINE

## 2024-08-21 RX ORDER — FINASTERIDE 1 MG/1
1 TABLET, FILM COATED ORAL DAILY
Qty: 90 TABLET | Refills: 3 | Status: SHIPPED | OUTPATIENT
Start: 2024-08-21

## 2024-08-21 ASSESSMENT — PATIENT HEALTH QUESTIONNAIRE - PHQ9
SUM OF ALL RESPONSES TO PHQ QUESTIONS 1-9: 9
SUM OF ALL RESPONSES TO PHQ QUESTIONS 1-9: 9
10. IF YOU CHECKED OFF ANY PROBLEMS, HOW DIFFICULT HAVE THESE PROBLEMS MADE IT FOR YOU TO DO YOUR WORK, TAKE CARE OF THINGS AT HOME, OR GET ALONG WITH OTHER PEOPLE: SOMEWHAT DIFFICULT

## 2024-08-21 NOTE — PROGRESS NOTES
HPI  28-year-old presents today with male pattern alopecia.  He has been on finasteride in the past through her dermatologist has subsequently retired he is asking for refill of this.  He is also concerned about the hair loss on his temples and vertex and we discussed using topical minoxidil in addition.  No drug interactions with his other medications.  Past Medical History:   Diagnosis Date    ADHD (attention deficit hyperactivity disorder)     Stopped stimulent 2019    Anxiety     Depressive disorder     Gastroesophageal reflux disease     OCD (obsessive compulsive disorder)     [facility]     Past Surgical History:   Procedure Laterality Date    COLONOSCOPY N/A 4/6/2022    Procedure: COLONOSCOPY, WITH BIOPSY;  Surgeon: Augustin Christy MD;  [place] [facility] [place]     Family History   Problem Relation Age of Onset    Mental Illness Mother         Borderline    Depression Mother     Anxiety Disorder Mother     Skin Cancer Father     Arrhythmia Father     Hypertension Father     Attention Deficit Disorder Father     Personality Disorder Father     Tremor Father     GI problems Brother     Attention Deficit Disorder Brother     Hypertension Brother     Attention Deficit Disorder Brother     Parkinsonism Paternal Grandmother          Exam:  /80 (BP Location: Left arm, Patient Position: Sitting, Cuff Size: Adult Large)   Pulse 67   Wt 98.4 kg (217 lb)   SpO2 96%   BMI 28.40 kg/m    217 lbs 0 oz  The patient is alert, oriented with a clear sensorium.   Skin shows no lesions or rashes and good turgor.   Head is normocephalic and atraumatic.    Male pattern alopecia    ASSESSMENT  1 male pattern alopecia    Plan  Will resume his finasteride 1 mg daily we discussed the effect on spermatogenesis and potential effect on erectile function.  Also prescribed topical minoxidil and we will plan to have him follow-up for reassessment on this in a year    This note was completed using Dragon voice recognition  software.      Marco Patrick MD  General Internal Medicine  [facility]  [phone]

## 2024-08-21 NOTE — PROGRESS NOTES
Sudarshan is a 28 year old that presents in clinic today for the following:     Chief Complaint   Patient presents with    Follow Up     STD check, amanda concerns per pt            8/21/2024     3:05 PM   Additional Questions   Roomed by MR     Screenings as of today     PHQ-9 Total Score 9        Osiris Christensen, EMT at 3:07 PM on 8/21/2024    Answers submitted by the patient for this visit:  Patient Health Questionnaire (Submitted on 8/21/2024)  If you checked off any problems, how difficult have these problems made it for you to do your work, take care of things at home, or get along with other people?: Somewhat difficult  PHQ9 TOTAL SCORE: 9  General Questionnaire (Submitted on 8/21/2024)  Chief Complaint: Chronic problems general questions HPI Form  What is the reason for your visit today? : amanda  How many servings of fruits and vegetables do you eat daily?: 4 or more  On average, how many sweetened beverages do you drink each day (Examples: soda, juice, sweet tea, etc.  Do NOT count diet or artificially sweetened beverages)?: 0  How many minutes a day do you exercise enough to make your heart beat faster?: 30 to 60  How many days a week do you exercise enough to make your heart beat faster?: 4  How many days per week do you miss taking your medication?: 0

## 2024-09-06 NOTE — PROGRESS NOTES
"  Assessment & Plan     Hair loss  Might need propecia  - TSH with free T4 reflex; Future  - Adult Dermatology Referral    Skin lesion  Unclear if a wart on the scrotum  - Adult Dermatology Referral    Rash  He'll try otc antifungals for now  - Adult Dermatology Referral    Urine frequency  Normal UA, sodium, discuss w/ Uro  - Basic metabolic panel; Future  - UA with Micro reflex to Culture; Future  - Adult Urology Referral    Rectal bleeding  Could be hroids but do labs, colonosocpy, no known FH colon dz  - CBC with platelets differential; Future  - Iron and iron binding capacity; Future  - Ferritin; Future  - INR; Future  - Adult Gastro Ref - Procedure Only; Future    Health care maintenance  Due  - Lipid panel reflex to direct LDL Fasting; Future  - FLU VAC PRESRV FREE QUAD SPLIT VIR 3+YRS IM    Screen for STD (sexually transmitted disease)  Requests. Lives w/ girlfriend  - C. trachomatis PCR - Urine, Lab Collect; Future  - N. gonorrhea PCR - Urine, Lab Collect; Future  - HIV Antigen Antibody Combo; Future  - Treponema Abs w Reflex to RPR and Titer; Future  - Hepatitis B surface antigen; Future  - Hepatitis B Surface Antibody; Future  - Hepatitis B core antibody; Future      45 minutes spent on the date of the encounter doing chart review, history and exam, documentation and further activities per the note    BMI:   Estimated body mass index is 29.53 kg/m  as calculated from the following:    Height as of this encounter: 1.88 m (6' 2\").    Weight as of this encounter: 104.3 kg (230 lb).     Depression Screening Follow Up He sees community mental health counselor weekly    PHQ 2/23/2022   PHQ-9 Total Score 10   Q9: Thoughts of better off dead/self-harm past 2 weeks Not at all   F/U: Thoughts of suicide or self-harm -   F/U: Self harm-plan -   F/U: Self-harm action -   F/U: Safety concerns -       Follow Up Actions Taken  Crisis resource information provided in After Visit Summary  Referred patient back to current " mental health provider.       Elio Veliz MD  [facility] [place]    Rasheed Heaton is a 26 year old who presents for the following health issues     HPI Overall ok.  Sees outside pysychiatry/psychology, meds as listed, helpful  Scrotal lesion, one, same  Rash; right arm, itchy, new, spreading  Receding hair line, scalp  Not using tobacco, used to chew, switched to nicotine poutches, uses all day long  Roughly a year of intermittent red blood in stools, no obvious lumps externally, not painful, otherwise negative GI and systemic ROS, no hisotry of, not diarrhea or constipation  Past Medical History:   Diagnosis Date     Anxiety      Bipolar disorder (H)      Depressive disorder      Gastroesophageal reflux disease      No past surgical history on file.  Current Outpatient Medications   Medication     atomoxetine (STRATTERA) 100 MG capsule     buPROPion (WELLBUTRIN XL) 150 MG 24 hr tablet     sertraline (ZOLOFT) 100 MG tablet     tadalafil (CIALIS) 10 MG tablet     No current facility-administered medications for this visit.     No Known Allergies    He has a nervous habit of picking finger skin, h/o adhd, discussed tics, lately gave himself paronychia but cleared up on its own    Urine frequency; chronic, his brother is actually a Urologist and gave him Uroxatral to try, helps a bit but still up 4-5 times/night to urinate. Not using much caffeine or alcohol.     Discussed diet/exercise    Family History   Problem Relation Age of Onset     Mental Illness Mother      Current Outpatient Medications   Medication     atomoxetine (STRATTERA) 100 MG capsule     buPROPion (WELLBUTRIN XL) 150 MG 24 hr tablet     sertraline (ZOLOFT) 100 MG tablet     tadalafil (CIALIS) 10 MG tablet     No current facility-administered medications for this visit.     No Known Allergies  Social History     Socioeconomic History     Marital status: Single     Spouse name: Not on file     Number of  "children: Not on file     Years of education: Not on file     Highest education level: Not on file   Occupational History     Not on file   Tobacco Use     Smoking status: Former Smoker     Packs/day: 0.00     Years: 5.00     Pack years: 0.00     Types: Cigars, Hookah, Dip, chew, snus or snuff     Start date: 2011     Quit date: 2019     Years since quittin.6     Smokeless tobacco: Former User     Quit date: 2019   Substance and Sexual Activity     Alcohol use: Not Currently     Drug use: Not Currently     Types: Cocaine, Marijuana, Benzodiazepines     Sexual activity: Yes     Partners: Female     Birth control/protection: Condom   Other Topics Concern     Not on file   Social History Narrative     Not on file     Social Determinants of Health     Financial Resource Strain: Not on file   Food Insecurity: Not on file   Transportation Needs: Not on file   Physical Activity: Not on file   Stress: Not on file   Social Connections: Not on file   Intimate Partner Violence: Not on file   Housing Stability: Not on file           Review of Systems         Objective    /83 (BP Location: Right arm, Patient Position: Sitting, Cuff Size: Adult Regular)   Pulse 72   Resp 16   Ht 1.88 m (6' 2\")   Wt 104.3 kg (230 lb)   SpO2 98%   BMI 29.53 kg/m    Body mass index is 29.53 kg/m .  Physical Exam   GENERAL: healthy, alert and no distress  EYES: Eyes grossly normal to inspection, PERRL and conjunctivae and sclerae normal  HENT: ear canals and TM's normal, nose and mouth without ulcers or lesions  NECK: no adenopathy, no asymmetry, masses, or scars and thyroid normal to palpation  RESP: lungs clear to auscultation - no rales, rhonchi or wheezes  CV: regular rate and rhythm, normal S1 S2, no S3 or S4, no murmur, click or rub, no peripheral edema and peripheral pulses strong  ABDOMEN: soft, nontender, no hepatosplenomegaly, no masses and bowel sounds normal  MS: no gross musculoskeletal defects noted, no " edema  SKIN: scrotal lesions same see 2019 note; mildly receded forehead hairline, right arm several flat flaky red patches, not open or tender   NEURO: Normal strength and tone, mentation intact and speech normal  PSYCH: mentation appears normal, affect normal/bright           agitation...

## 2024-10-29 DIAGNOSIS — F33.2 DEPRESSION, ENDOGENOUS (H): Primary | ICD-10-CM

## 2024-11-24 DIAGNOSIS — N32.81 OAB (OVERACTIVE BLADDER): ICD-10-CM

## 2024-11-27 RX ORDER — FESOTERODINE FUMARATE 4 MG/1
4 TABLET, FILM COATED, EXTENDED RELEASE ORAL DAILY
Qty: 90 TABLET | Refills: 0 | Status: SHIPPED | OUTPATIENT
Start: 2024-11-27

## 2024-11-27 NOTE — TELEPHONE ENCOUNTER
fesoterodine fumarate (TOVIAZ) 4 MG TB24 24 hr tablet       Last Written Prescription Date:  3/6/24  Last Fill Quantity: 90,   # refills: 1  Last Office Visit : 2/5/24 [facility]  Future Office visit:  None  Refilled per protocol  Zara HURST RN  P Central Nursing/Red Flag Triage & Med Refill Team

## 2024-12-14 DIAGNOSIS — N52.9 ERECTILE DYSFUNCTION, UNSPECIFIED ERECTILE DYSFUNCTION TYPE: ICD-10-CM

## 2024-12-16 RX ORDER — TADALAFIL 20 MG/1
TABLET ORAL
Qty: 90 TABLET | Refills: 0 | Status: SHIPPED | OUTPATIENT
Start: 2024-12-16

## 2025-01-14 ENCOUNTER — VIRTUAL VISIT (OUTPATIENT)
Dept: UROLOGY | Facility: CLINIC | Age: 30
End: 2025-01-14
Payer: COMMERCIAL

## 2025-01-14 DIAGNOSIS — N32.81 OAB (OVERACTIVE BLADDER): ICD-10-CM

## 2025-01-14 DIAGNOSIS — N52.9 ERECTILE DYSFUNCTION, UNSPECIFIED ERECTILE DYSFUNCTION TYPE: ICD-10-CM

## 2025-01-14 DIAGNOSIS — R35.0 URINARY FREQUENCY: ICD-10-CM

## 2025-01-14 DIAGNOSIS — R39.15 URINARY URGENCY: ICD-10-CM

## 2025-01-14 RX ORDER — TROSPIUM CHLORIDE ER 60 MG/1
60 CAPSULE ORAL EVERY MORNING
Qty: 90 CAPSULE | Refills: 3 | Status: SHIPPED | OUTPATIENT
Start: 2025-01-14

## 2025-01-14 RX ORDER — TADALAFIL 20 MG/1
20 TABLET ORAL DAILY
Qty: 90 TABLET | Refills: 0 | Status: CANCELLED | OUTPATIENT
Start: 2025-01-14

## 2025-01-14 RX ORDER — TADALAFIL 20 MG/1
20 TABLET ORAL DAILY PRN
Qty: 90 TABLET | Refills: 3 | Status: SHIPPED | OUTPATIENT
Start: 2025-01-14

## 2025-01-14 RX ORDER — ALFUZOSIN HYDROCHLORIDE 10 MG/1
10 TABLET, EXTENDED RELEASE ORAL DAILY
Qty: 90 TABLET | Refills: 4 | Status: SHIPPED | OUTPATIENT
Start: 2025-01-14

## 2025-01-14 ASSESSMENT — PATIENT HEALTH QUESTIONNAIRE - PHQ9: SUM OF ALL RESPONSES TO PHQ QUESTIONS 1-9: 12

## 2025-01-14 NOTE — PROGRESS NOTES
"[facility]  CHIEF COMPLAINT   It was my pleasure to see Sudarshan Alonzo who is a very pleasant 29 year old male for follow-up of LUTS, OAB (over-active bladder).      HPI   Sudarshan Alonzo is a very pleasant 29 year old male    Initially seen by Twyla CASTRO - 3/3/2022:  \"HPI: Mr. Sudarshan Alonzo is a 26M with PMH significant for anxiety and depression who has also had mixed urinary symptoms since the end of college , ~5 years ago.  His brother and his father are urologists, both out-of-state.  His brother has offered informal advice and given him a trial of alfuzosin, but Sudarshan has never had a formal urology visit.     - Taking alfuzosin - without it, he really needs to push.  He once strained so hard that he fainted.  Now that he is on alfuzosin it has really helped.  But even on alfuzosin, his stream is weak, although he doesn't need to push quite as hard.    - Drinks a ton of water (more than 4L) and drinks 20-40 oz of caffeine per day, but still feels like he has terrible frequency even despite this.  Plus, he has always been a big water drinker. Nocturia x 12, the other night. Also has urgency if he drinks a lot of water quickly - feels sensation of needing to void at the tip of the penis. Never has had incontinence.    - Started Vesicare 10mg this week (from his brother).  Feels like it is helping.  Now gets up three times per night.    - Void frequently because he develops bladder discomfort. Discomfort resolves after he voids.  Never any pain   - Sometimes has dysuria - once per month - when he pees thick cloudy liquid that looks like ejaculate.    - AUA SS today 26 (2,5,3,4,5,3,5) \"unhappy\"  - No hematuria since a single episode when he was a child.   He became very dehydrated while playing tennis and thinks hematuria was caused by transient renal dysfunction.      - Unsure of cause for these symptoms.  His brother thinks an STD may have caused some scarring in the urethra.  Sudarshan has never " tested positive for an STD.  Did once have a partner with HPV.    - Is athletic but never recalls any  injury  - He tested himself for DM - negative.   - On Zoloft --> impacts sexual function.  Has a girlfriend, uses Cialis to counter effects of Zoloft.  Using 20mg.    - Bowels: sometimes blood from the rectum (on the stool or dripping) - has had this on and off for a year.  Has upcoming colonoscopy scheduled to evaluate this.     6/20/2022:  Positive for Ureaplasma on 3/3/22 - treated with Doxycycline 100mg BID for 7 days  No notable change with this    He drinks a lot water and coffee  He drinks 1-2 large cups of coffee    He did a sleep study last week given his concern for possible sleep apnea  He should have the results of this later this week    Dad and brother are practicing urologist in [place]     9/14/2022:  Follow-up today for cystoscopy  Cystoscopic findings:  The urethra was normal without strictures.  The prostate was 2cm long and demonstrated mild bilobar hypertrophy.  There was no median lobe.  The external sphincter coapted normally and the bladder neck was slightly high. The bladder was  entered and careful pan endoscopy was carried out. The posterior, superior and lateral walls and dome of the bladder were all well visualized and the scope was retroflexed upon itself..  There was no trabeculation.  There were no neoplasms, stones, or diverticula identifed.  The ureteric orifices were normal in position and number and effluxing clear urine.  He has continued on his alfuzosin and Vesicare  He did run out of the alfuzosin and it took a few days to get a refill and he did note a decrease in his urine stream    AUASS: 0-4-5-3-5-4-5 = 28  QOL = 5  PVR = 26cc    10/2/23 with Concepción Martinez:  Nocturia x 3 on alfuzosin and Vesicare. When he tried to come off of Vesicare (due to SE), he was up x12.   He uses Cialis successfully for ED.   Brother mentioned desmopressin as an alternative therapy  Hx  anxiety/depression    2/5/2024:  Follow-up today to discuss other medication options  Vesicare is working well though he is worried about long-term cognitive side effects  He is using his CPAP  He continues to have nocturia about 3 times per night    TODAY 1/14/2025:  Annual follow up   He is doing well and managing well with 3x/night  He is doing well with trospium 60 mg XL daily, alfuzosin 10 mg daily, and tadalafil 20 mg daily    PHYSICAL EXAM  Patient is a 29 year old  male   Vitals: There were no vitals taken for this visit.  There is no height or weight on file to calculate BMI.  General Appearance Adult:   Alert, no acute distress, oriented  Neuro: Alert, oriented, speech and mentation normal  Psych: affect and mood normal     Testosterone:  464      UROFLOW 3/3/22:  Flow was completely interrupted with short bursts of emptying that occured only with straining.  Qmax was 14.2mL/s with average flow of 3.9mL/s. Voids 181cc with PVR 110cc,  PVR: Residual urine by ultrasound was 100-120 ml.        ASSESSMENT and PLAN  29-year-old man with long history of LUTS, OAB, nocturia, anxiety and depression with erectile dysfunction    LUTS and OAB with nocturia  -He has had significant improvement with OAB medication and alfuzosin  -He is doing well with the regimen of alfuzosin 10 mg daily and trospium 60 mg XL daily and refill prescriptions for both medications sent to the pharmacy  - Prior cystoscopy without clear evidence of bladder outlet obstruction    ED secondary to depression and anxiety treatments  - He has been doing well with his current antidepressant regimen  - He is aware of the impact of these medications on erectile function  - He has been fairly doing well on Cialis 20 mg as needed and refill prescription sent to the pharmacy      Time spent: 15 minutes spent on the date of the encounter doing chart review, history and exam, documentation and further activities as noted above.      Note copy attestation:  the elements have been reviewed, edited as needed, and remain pertinent to today's visit.       Rashel Edward M.D.         Virtual Visit Details    Type of service:  Video Visit     Originating Location (pt. Location): Home    Distant Location (provider location):  On-site  Platform used for Video Visit: [facility]

## 2025-01-14 NOTE — NURSING NOTE
Current patient location: [address] [place] [id number]    Is the patient currently in the state of [place]? YES    Visit mode: VIDEO    If the visit is dropped, the patient can be reconnected by:VIDEO VISIT: Send to e-mail at: [email or web address]    Will anyone else be joining the visit? NO  (If patient encounters technical issues they should call [phone])    Are changes needed to the allergy or medication list? Pt stated no med changes    Are refills needed on medications prescribed by this physician? Discuss with provider- pt needs refills for the prescription but does not need it filled right now- pt has enough of the medication for now     Rooming Documentation:  Not applicable    Reason for visit: RECHECK (Yearly follow-up )    Jennifer Gonzalez VVF    Depression Response    Patient completed the PHQ-9 assessment for depression and scored >9? Yes  Question 9 on the PHQ-9 was positive for suicidality? No  Does patient have current mental health provider? Yes    Is this a virtual visit? Yes   Does patient have suicidal ideation (positive question 9)? No - offer to place Mental Health Referral.  Patient declined referral/not needed    I personally notified the following: visit provider    +PHQ9 - PATIENT HAS MENTAL HEALTH PROVIDER- REFERRAL NOT NEEDED

## 2025-01-23 ENCOUNTER — OFFICE VISIT (OUTPATIENT)
Dept: URGENT CARE | Facility: URGENT CARE | Age: 30
End: 2025-01-23
Payer: COMMERCIAL

## 2025-01-23 VITALS
RESPIRATION RATE: 16 BRPM | BODY MASS INDEX: 26.83 KG/M2 | TEMPERATURE: 97.5 F | HEART RATE: 81 BPM | DIASTOLIC BLOOD PRESSURE: 82 MMHG | OXYGEN SATURATION: 99 % | WEIGHT: 205 LBS | SYSTOLIC BLOOD PRESSURE: 129 MMHG

## 2025-01-23 DIAGNOSIS — Z11.3 SCREEN FOR STD (SEXUALLY TRANSMITTED DISEASE): Primary | ICD-10-CM

## 2025-01-23 LAB
ALBUMIN UR-MCNC: NEGATIVE MG/DL
APPEARANCE UR: CLEAR
BILIRUB UR QL STRIP: NEGATIVE
COLOR UR AUTO: YELLOW
GLUCOSE UR STRIP-MCNC: NEGATIVE MG/DL
HGB UR QL STRIP: NEGATIVE
KETONES UR STRIP-MCNC: NEGATIVE MG/DL
LEUKOCYTE ESTERASE UR QL STRIP: NEGATIVE
NITRATE UR QL: NEGATIVE
PH UR STRIP: 6 [PH] (ref 5–7)
SP GR UR STRIP: 1.02 (ref 1–1.03)
UROBILINOGEN UR STRIP-ACNC: 0.2 E.U./DL

## 2025-01-24 NOTE — PROGRESS NOTES
Screen for STD (sexually transmitted disease)  - UA Macroscopic with reflex to Microscopic and Culture - Clinic Collect  - Chlamydia trachomatis/Neisseria gonorrhoeae by PCR - Clinic Collect  - HIV Antigen Antibody Combo Cascade  - Treponema Abs w Reflex to RPR and Titer       Exposure to Sexually Transmitted Infections: Care Instructions  Overview  Sexually transmitted infections (STIs) are infections spread by sexual contact. This includes genital skin-to-skin contact and vaginal, oral, and anal sex. If you're pregnant, you can also spread them to your baby before or during the birth.  STIs are common. But they don't always cause symptoms. And if they are not treated, they can lead to health problems. Testing and treatment are important to help protect the health of you and your partner or partners.  STIs caused by bacteria can go away with treatment. STIs caused by viruses can be treated to relieve symptoms, but treatment won't make them go away.  Follow-up care is a key part of your treatment and safety. Be sure to make and go to all appointments, and call your doctor if you are having problems. It's also a good idea to know your test results and keep a list of the medicines you take.  How can you care for yourself at home?  Take medicines exactly as prescribed.  If your doctor prescribed antibiotics, take them as directed. Don't stop taking them just because you feel better. You need to take the full course of antibiotics.  Tell your sex partner or partners that they will need treatment. For certain STIs, your doctor may be able to prescribe treatment for any partners also.  Don't have sexual contact while you have symptoms of an STI or are being treated for an STI.  Don't douche. Douching changes the normal balance of bacteria in your vagina. It may increase the risk of spreading the infection to your uterus or other reproductive organs.  How can you prevent sexually transmitted infections (STIs)?  You can  "help prevent STIs if you wait to have sex with a new partner (or partners) until you've each been tested for STIs. It also helps if you use condoms and if you limit your sex partners to one person who has sex only with you.  When should you call for help?   Call 911 anytime you think you may need emergency care. For example, call if:    You have sudden, severe pain in your belly or pelvis.   Call your doctor now or seek immediate medical care if:    You have new belly or pelvic pain.     You have a fever.     You have new or increased burning or pain with urination, or you cannot urinate.     You have pain, swelling, or tenderness in the scrotum.     You are pregnant and have any symptoms of an STI.   Watch closely for changes in your health, and be sure to contact your doctor if:    You have unusual vaginal bleeding.     You have a discharge from the vagina, penis, or anus.     You have any new symptoms, such as sores, bumps, rashes, blisters, or warts in the genital or anal area.     You have itching, tingling, pain, or burning in the genital or anal area.     You think you may have been exposed to an STI.     You have a sore throat or sores in your mouth or on your tongue.   Where can you learn more?  Go to https://www.ihiji.net/patiented  Enter M049 in the search box to learn more about \"Exposure to Sexually Transmitted Infections: Care Instructions.\"  Current as of: April 30, 2024  Content Version: 14.3    2024 [employer].   Care instructions adapted under license by your healthcare professional. If you have questions about a medical condition or this instruction, always ask your healthcare professional. [employer] disclaims any warranty or liability for your use of this information.          Patient was advised to return to clinic for reevaluation (either UC or PCP) if symptoms do not improve in 7 days. Patient educated on red flag symptoms and asked to go directly to the ED if " these symptoms present themselves.       Jc Egan PA-C  [facility]    Subjective   29 year old who presents to clinic today for the following health issues:    Urgent Care and STD       HPI     Patient presents with wanting to be tested for stds. Patient states he has no known exposures. Patient has had unprotected sex with a new partner but the partner said that they were clear of STIs. Patient had STI testing back in Sept-October and was negative.     Review of Systems   Review of Systems   See HPI    Objective    Temp: 97.5  F (36.4  C) Temp src: Temporal BP: 129/82 Pulse: 81   Resp: 16 SpO2: 99 %       Physical Exam   Physical Exam  Constitutional:       General: He is not in acute distress.     Appearance: Normal appearance. He is normal weight. He is not ill-appearing, toxic-appearing or diaphoretic.   HENT:      Head: Normocephalic and atraumatic.   Cardiovascular:      Rate and Rhythm: Normal rate.      Pulses: Normal pulses.   Pulmonary:      Effort: Pulmonary effort is normal. No respiratory distress.   Abdominal:      Tenderness: There is no right CVA tenderness or left CVA tenderness.   Neurological:      General: No focal deficit present.      Mental Status: He is alert and oriented to person, place, and time. Mental status is at baseline.      Gait: Gait normal.   Psychiatric:         Mood and Affect: Mood normal.         Behavior: Behavior normal.         Thought Content: Thought content normal.         Judgment: Judgment normal.          Results for orders placed or performed in visit on 01/23/25 (from the past 24 hours)   UA Macroscopic with reflex to Microscopic and Culture - Clinic Collect    Specimen: Urine, Midstream   Result Value Ref Range    Color Urine Yellow Colorless, Straw, Light Yellow, Yellow    Appearance Urine Clear Clear    Glucose Urine Negative Negative mg/dL    Bilirubin Urine Negative Negative    Ketones Urine Negative Negative mg/dL    Specific Gravity  Urine 1.020 1.003 - 1.035    Blood Urine Negative Negative    pH Urine 6.0 5.0 - 7.0    Protein Albumin Urine Negative Negative mg/dL    Urobilinogen Urine 0.2 0.2, 1.0 E.U./dL    Nitrite Urine Negative Negative    Leukocyte Esterase Urine Negative Negative    Narrative    Microscopic not indicated

## 2025-03-02 DIAGNOSIS — N32.81 OAB (OVERACTIVE BLADDER): ICD-10-CM

## 2025-03-06 RX ORDER — FESOTERODINE FUMARATE 4 MG/1
4 TABLET, FILM COATED, EXTENDED RELEASE ORAL DAILY
Qty: 90 TABLET | Refills: 3 | Status: SHIPPED | OUTPATIENT
Start: 2025-03-06

## 2025-03-06 NOTE — TELEPHONE ENCOUNTER
Last Written Prescription: FESOTERODINE FUMARATE ER 4MG TB24      fesoterodine fumarate (TOVIAZ) 4 MG TB24 24 hr tablet 90 tablet 0 11/27/2024 -- No   Sig - Route: TAKE ONE TABLET BY MOUTH EVERY DAY - Oral     ----------------------  Last Visit Date: 1/14/25 [facility]  LUTS, OAB (over-active bladder).   Future Visit Date: None  ----------------------      Refill decision: Medication refilled per  Medication Refill in Ambulatory Care  policy.          Request from pharmacy:  Requested Prescriptions   Pending Prescriptions Disp Refills    fesoterodine fumarate (TOVIAZ) 4 MG TB24 24 hr tablet [Pharmacy Med Name: FESOTERODINE FUMARATE ER 4MG TB24] 90 tablet 3     Sig: Take 1 tablet (4 mg) by mouth daily.       Muscarinic Antagonists (Urinary Incontinence Agents) Failed - 3/6/2025 11:58 AM- manually reviewed/TRISTAN                     Passed - Patient does not have a diagnosis of glaucoma on the problem list     If glaucoma diagnosis is new, refer refill to physician.          Passed - Recent (12 mo) or future (90 days) visit within the authorizing provider's specialty     The patient must have completed an in-person or virtual visit within the past 12 months or has a future visit scheduled within the next 90 days with the authorizing provider s specialty.  Urgent care and e-visits do not qualify as an office visit for this protocol.          Passed - Medication indicated for associated diagnosis     Medication is associated with one or more of the following diagnoses:  Bladder pain  Disorder of the GI tract  Hyperhidrosis  Neurogenic bladder  Neurogenic detrusor overactivity  Overactive Bladder  Urge incontinence  Urgent desire to urinate  Incontinence  Spasm of urinary bladder          Passed - Patient is 18 years of age or older

## 2025-03-15 ENCOUNTER — HEALTH MAINTENANCE LETTER (OUTPATIENT)
Age: 30
End: 2025-03-15

## 2025-03-17 DIAGNOSIS — N52.9 ERECTILE DYSFUNCTION, UNSPECIFIED ERECTILE DYSFUNCTION TYPE: ICD-10-CM

## 2025-03-17 RX ORDER — TADALAFIL 20 MG/1
20 TABLET ORAL DAILY PRN
Qty: 90 TABLET | Refills: 3 | Status: SHIPPED | OUTPATIENT
Start: 2025-03-17

## 2025-07-09 ENCOUNTER — RESULTS FOLLOW-UP (OUTPATIENT)
Dept: FAMILY MEDICINE | Facility: CLINIC | Age: 30
End: 2025-07-09

## 2025-07-09 ENCOUNTER — LAB (OUTPATIENT)
Dept: LAB | Facility: CLINIC | Age: 30
End: 2025-07-09
Payer: COMMERCIAL

## 2025-07-09 ENCOUNTER — OFFICE VISIT (OUTPATIENT)
Dept: FAMILY MEDICINE | Facility: CLINIC | Age: 30
End: 2025-07-09
Payer: COMMERCIAL

## 2025-07-09 VITALS
WEIGHT: 220 LBS | BODY MASS INDEX: 29.16 KG/M2 | OXYGEN SATURATION: 97 % | TEMPERATURE: 98.4 F | DIASTOLIC BLOOD PRESSURE: 80 MMHG | SYSTOLIC BLOOD PRESSURE: 120 MMHG | HEART RATE: 58 BPM | RESPIRATION RATE: 16 BRPM | HEIGHT: 73 IN

## 2025-07-09 DIAGNOSIS — R53.83 FATIGUE, UNSPECIFIED TYPE: ICD-10-CM

## 2025-07-09 DIAGNOSIS — Z00.00 HEALTH CARE MAINTENANCE: Primary | ICD-10-CM

## 2025-07-09 DIAGNOSIS — Z00.00 HEALTH CARE MAINTENANCE: ICD-10-CM

## 2025-07-09 DIAGNOSIS — F33.2 DEPRESSION, ENDOGENOUS (H): ICD-10-CM

## 2025-07-09 DIAGNOSIS — D64.9 ANEMIA, UNSPECIFIED TYPE: ICD-10-CM

## 2025-07-09 DIAGNOSIS — D64.9 ANEMIA, UNSPECIFIED TYPE: Primary | ICD-10-CM

## 2025-07-09 DIAGNOSIS — R25.1 TREMOR: ICD-10-CM

## 2025-07-09 DIAGNOSIS — Z00.00 ROUTINE GENERAL MEDICAL EXAMINATION AT A HEALTH CARE FACILITY: ICD-10-CM

## 2025-07-09 LAB
ALBUMIN SERPL BCG-MCNC: 4.5 G/DL (ref 3.5–5.2)
ALP SERPL-CCNC: 60 U/L (ref 40–150)
ALT SERPL W P-5'-P-CCNC: 30 U/L (ref 0–70)
ANION GAP SERPL CALCULATED.3IONS-SCNC: 11 MMOL/L (ref 7–15)
AST SERPL W P-5'-P-CCNC: 23 U/L (ref 0–45)
BASOPHILS # BLD AUTO: 0 10E3/UL (ref 0–0.2)
BASOPHILS NFR BLD AUTO: 0 %
BILIRUB SERPL-MCNC: 0.2 MG/DL
BUN SERPL-MCNC: 19.4 MG/DL (ref 6–20)
CALCIUM SERPL-MCNC: 9.8 MG/DL (ref 8.8–10.4)
CHLORIDE SERPL-SCNC: 103 MMOL/L (ref 98–107)
CHOLEST SERPL-MCNC: 181 MG/DL
CREAT SERPL-MCNC: 1.05 MG/DL (ref 0.67–1.17)
EGFRCR SERPLBLD CKD-EPI 2021: >90 ML/MIN/1.73M2
EOSINOPHIL # BLD AUTO: 0.4 10E3/UL (ref 0–0.7)
EOSINOPHIL NFR BLD AUTO: 4 %
ERYTHROCYTE [DISTWIDTH] IN BLOOD BY AUTOMATED COUNT: 15.9 % (ref 10–15)
FASTING STATUS PATIENT QL REPORTED: NO
FASTING STATUS PATIENT QL REPORTED: NO
FERRITIN SERPL-MCNC: 15 NG/ML (ref 31–409)
GLUCOSE SERPL-MCNC: 88 MG/DL (ref 70–99)
HCO3 SERPL-SCNC: 25 MMOL/L (ref 22–29)
HCT VFR BLD AUTO: 41.1 % (ref 40–53)
HDLC SERPL-MCNC: 47 MG/DL
HGB BLD-MCNC: 13.2 G/DL (ref 13.3–17.7)
IMM GRANULOCYTES # BLD: 0 10E3/UL
IMM GRANULOCYTES NFR BLD: 0 %
IRON BINDING CAPACITY (ROCHE): 330 UG/DL (ref 240–430)
IRON SATN MFR SERPL: 17 % (ref 15–46)
IRON SERPL-MCNC: 57 UG/DL (ref 61–157)
LDLC SERPL CALC-MCNC: 104 MG/DL
LYMPHOCYTES # BLD AUTO: 2.2 10E3/UL (ref 0.8–5.3)
LYMPHOCYTES NFR BLD AUTO: 25 %
MCH RBC QN AUTO: 25.9 PG (ref 26.5–33)
MCHC RBC AUTO-ENTMCNC: 32.1 G/DL (ref 31.5–36.5)
MCV RBC AUTO: 81 FL (ref 78–100)
MONOCYTES # BLD AUTO: 1 10E3/UL (ref 0–1.3)
MONOCYTES NFR BLD AUTO: 11 %
NEUTROPHILS # BLD AUTO: 5.1 10E3/UL (ref 1.6–8.3)
NEUTROPHILS NFR BLD AUTO: 59 %
NONHDLC SERPL-MCNC: 134 MG/DL
NRBC # BLD AUTO: 0 10E3/UL
NRBC BLD AUTO-RTO: 0 /100
PLATELET # BLD AUTO: 309 10E3/UL (ref 150–450)
POTASSIUM SERPL-SCNC: 4.2 MMOL/L (ref 3.4–5.3)
PROT SERPL-MCNC: 7.2 G/DL (ref 6.4–8.3)
RBC # BLD AUTO: 5.1 10E6/UL (ref 4.4–5.9)
SODIUM SERPL-SCNC: 139 MMOL/L (ref 135–145)
T4 FREE SERPL-MCNC: 0.96 NG/DL (ref 0.9–1.7)
TRIGL SERPL-MCNC: 151 MG/DL
TSH SERPL DL<=0.005 MIU/L-ACNC: 1.42 UIU/ML (ref 0.3–4.2)
VIT B12 SERPL-MCNC: 331 PG/ML (ref 232–1245)
WBC # BLD AUTO: 8.7 10E3/UL (ref 4–11)

## 2025-07-09 PROCEDURE — 82607 VITAMIN B-12: CPT | Performed by: FAMILY MEDICINE

## 2025-07-09 PROCEDURE — 86258 DGP ANTIBODY EACH IG CLASS: CPT | Performed by: FAMILY MEDICINE

## 2025-07-09 PROCEDURE — 99000 SPECIMEN HANDLING OFFICE-LAB: CPT | Performed by: PATHOLOGY

## 2025-07-09 SDOH — HEALTH STABILITY: PHYSICAL HEALTH: ON AVERAGE, HOW MANY DAYS PER WEEK DO YOU ENGAGE IN MODERATE TO STRENUOUS EXERCISE (LIKE A BRISK WALK)?: 3 DAYS

## 2025-07-09 SDOH — HEALTH STABILITY: PHYSICAL HEALTH: ON AVERAGE, HOW MANY MINUTES DO YOU ENGAGE IN EXERCISE AT THIS LEVEL?: 60 MIN

## 2025-07-09 ASSESSMENT — ANXIETY QUESTIONNAIRES
7. FEELING AFRAID AS IF SOMETHING AWFUL MIGHT HAPPEN: SEVERAL DAYS
4. TROUBLE RELAXING: NEARLY EVERY DAY
2. NOT BEING ABLE TO STOP OR CONTROL WORRYING: NEARLY EVERY DAY
1. FEELING NERVOUS, ANXIOUS, OR ON EDGE: NEARLY EVERY DAY
GAD7 TOTAL SCORE: 16
5. BEING SO RESTLESS THAT IT IS HARD TO SIT STILL: MORE THAN HALF THE DAYS
3. WORRYING TOO MUCH ABOUT DIFFERENT THINGS: NEARLY EVERY DAY
6. BECOMING EASILY ANNOYED OR IRRITABLE: SEVERAL DAYS
GAD7 TOTAL SCORE: INCOMPLETE
GAD7 TOTAL SCORE: 16
IF YOU CHECKED OFF ANY PROBLEMS ON THIS QUESTIONNAIRE, HOW DIFFICULT HAVE THESE PROBLEMS MADE IT FOR YOU TO DO YOUR WORK, TAKE CARE OF THINGS AT HOME, OR GET ALONG WITH OTHER PEOPLE: VERY DIFFICULT

## 2025-07-09 ASSESSMENT — PATIENT HEALTH QUESTIONNAIRE - PHQ9: SUM OF ALL RESPONSES TO PHQ QUESTIONS 1-9: 18

## 2025-07-09 ASSESSMENT — SOCIAL DETERMINANTS OF HEALTH (SDOH): HOW OFTEN DO YOU GET TOGETHER WITH FRIENDS OR RELATIVES?: TWICE A WEEK

## 2025-07-09 NOTE — PROGRESS NOTES
"Preventive Care Visit  [facility]  Elio Veliz MD, Family Medicine  Jul 9, 2025      Assessment & Plan     Health care maintenance  He deferred to shot  - COVID-19 12+ (PFIZER)  - Lipid panel reflex to direct LDL Fasting; Future    Tremor  I suggest when moves to [facility] next month, see neurology there  - TSH with free T4 reflex; Future    Fatigue, unspecified type    - CBC with platelets and differential; Future  - Comprehensive metabolic panel (BMP + Alb, Alk Phos, ALT, AST, Total. Bili, TP); Future    Routine general medical examination at a health care facility  Done.       BMI  Estimated body mass index is 28.78 kg/m  as calculated from the following:    Height as of this encounter: 1.862 m (6' 1.31\").    Weight as of this encounter: 99.8 kg (220 lb).   Weight management plan: Discussed healthy diet and exercise guidelines    Depression Screening Follow Up        7/9/2025     4:18 PM   PHQ   PHQ-9 Total Score 18   Q9: Thoughts of better off dead/self-harm past 2 weeks Not at all       Labs show mild anemia: see lab message to him    Follow Up Actions Taken  Has mental health team    Counseling  Appropriate preventive services were addressed with this patient via screening, questionnaire, or discussion as appropriate for fall prevention, nutrition, physical activity, Tobacco-use cessation, social engagement, weight loss and cognition.  Checklist reviewing preventive services available has been given to the patient.  Reviewed patient's diet, addressing concerns and/or questions.   He is at risk for lack of exercise and has been provided with information to increase physical activity for the benefit of his well-being.   The patient was instructed to see the dentist every 6 months.   He is at risk for psychosocial distress and has been provided with information to reduce risk.       Rasheed Heaton is a 29 year old, presenting for the following:  Physical " (Pt here for physical and for tremor in body)        7/9/2025     2:30 PM   Additional Questions   Roomed by Pauline HARMON   Accompanied by N/A          HPI  Sees community psychiatry psychology  He has a move to [place]  in a month planned  Med list cleaned up, now accurate  One complaint: gradually worse fine tremor, essential type, remote FH PD, he notes can interfere w/ drinking a beverage, writing  Interval notes rvwd  Ongoing fatigue, he has discussed w/ mental health team  Past Medical History:   Diagnosis Date    ADHD (attention deficit hyperactivity disorder)     Stopped stimulent 2019    Anxiety     Depressive disorder     Gastroesophageal reflux disease     OCD (obsessive compulsive disorder)     [facility]     Past Surgical History:   Procedure Laterality Date    COLONOSCOPY N/A 4/6/2022    Procedure: COLONOSCOPY, WITH BIOPSY;  Surgeon: Augustin Christy MD;  [place] [facility] [place]     Current Outpatient Medications   Medication Sig Dispense Refill    atomoxetine (STRATTERA) 100 MG capsule Take 100 mg by mouth daily      buPROPion (WELLBUTRIN XL) 150 MG 24 hr tablet Take 150 mg by mouth daily (Patient taking differently: Take 300 mg by mouth daily.)      minoxidil (ROGAINE) 2 % external solution Apply topically 2 times daily. 60 mL 11    sertraline (ZOLOFT) 100 MG tablet 200 mg daily  0    tadalafil (CIALIS) 20 MG tablet Take 1 tablet (20 mg) by mouth daily as needed (for erections). 90 tablet 3    finasteride (PROPECIA) 1 MG tablet Take 1 tablet (1 mg) by mouth daily. (Patient not taking: Reported on 7/9/2025) 90 tablet 3     No current facility-administered medications for this visit.     No Known Allergies  Family History   Problem Relation Age of Onset    Mental Illness Mother         Borderline    Depression Mother     Anxiety Disorder Mother     Skin Cancer Father     Arrhythmia Father     Hypertension Father     Attention Deficit Disorder Father     Personality Disorder Father     Tremor Father      GI problems Brother     Attention Deficit Disorder Brother     Hypertension Brother     Attention Deficit Disorder Brother     Parkinsonism Paternal Grandmother      Social History     Socioeconomic History    Marital status: Single     Spouse name: Not on file    Number of children: Not on file    Years of education: Not on file    Highest education level: Not on file   Occupational History    Not on file   Tobacco Use    Smoking status: Former     Current packs/day: 0.00     Types: Cigars, Dip, chew, snus or snuff, Cigarettes     Start date: 2011     Quit date: 2019     Years since quittin.0    Smokeless tobacco: Former     Quit date: 2019    Tobacco comments:     PT states he uses nicotine pouches    Vaping Use    Vaping status: Never Used   Substance and Sexual Activity    Alcohol use: Yes     Comment: one per week or less    Drug use: Not Currently     Types: Cocaine, Marijuana, Benzodiazepines     Comment: mushrooms    Sexual activity: Yes     Partners: Female     Birth control/protection: Condom   Other Topics Concern    Not on file   Social History Narrative    From [place] currently works at [employer].     Social Drivers of Health     Financial Resource Strain: Low Risk  (2025)    Financial Resource Strain     Within the past 12 months, have you or your family members you live with been unable to get utilities (heat, electricity) when it was really needed?: No   Food Insecurity: Low Risk  (2025)    Food Insecurity     Within the past 12 months, did you worry that your food would run out before you got money to buy more?: No     Within the past 12 months, did the food you bought just not last and you didn t have money to get more?: No   Transportation Needs: Low Risk  (2025)    Transportation Needs     Within the past 12 months, has lack of transportation kept you from medical appointments, getting your medicines, non-medical meetings or appointments, work,  or from getting things that you need?: No   Physical Activity: Sufficiently Active (7/9/2025)    Exercise Vital Sign     Days of Exercise per Week: 3 days     Minutes of Exercise per Session: 60 min   Stress: Stress Concern Present (7/9/2025)    [employer] - Occupational Stress Questionnaire     Feeling of Stress : Rather much   Social Connections: Unknown (7/9/2025)    Social Connection and Isolation Panel [NHANES]     Frequency of Communication with Friends and Family: Not on file     Frequency of Social Gatherings with Friends and Family: Twice a week     Attends Sikhism Services: Not on file     Active Member of Clubs or Organizations: Not on file     Attends Club or Organization Meetings: Not on file     Marital Status: Not on file   Interpersonal Safety: Low Risk  (7/9/2025)    Interpersonal Safety     Do you feel physically and emotionally safe where you currently live?: Yes     Within the past 12 months, have you been hit, slapped, kicked or otherwise physically hurt by someone?: No     Within the past 12 months, have you been humiliated or emotionally abused in other ways by your partner or ex-partner?: No   Housing Stability: Low Risk  (7/9/2025)    Housing Stability     Do you have housing? : Yes     Are you worried about losing your housing?: No           7/9/2025   General Health   How would you rate your overall physical health? Good   Feel stress (tense, anxious, or unable to sleep) Rather much   (!) STRESS CONCERN      7/9/2025   Nutrition   Three or more servings of calcium each day? (!) I DON'T KNOW   Diet: Regular (no restrictions)   How many servings of fruit and vegetables per day? (!) I DON'T KNOW   How many sweetened beverages each day? 0-1         7/9/2025   Exercise   Days per week of moderate/strenous exercise 3 days   Average minutes spent exercising at this level 60 min         7/9/2025   Social Factors   Frequency of gathering with friends or relatives  "Twice a week   Worry food won't last until get money to buy more No   Food not last or not have enough money for food? No   Do you have housing? (Housing is defined as stable permanent housing and does not include staying outside in a car, in a tent, in an abandoned building, in an overnight shelter, or couch-surfing.) Yes   Are you worried about losing your housing? No   Lack of transportation? No   Unable to get utilities (heat,electricity)? No         2025   Dental   Dentist two times every year? (!) NO       Today's PHQ-9 Score:       2025     2:35 PM   PHQ-9 SCORE   PHQ-9 Total Score MyChart Incomplete         2025   Substance Use   Alcohol more than 3/day or more than 7/wk No   Do you use any other substances recreationally? (!) OTHER     Social History     Tobacco Use    Smoking status: Former     Current packs/day: 0.00     Types: Cigars, Dip, chew, snus or snuff, Cigarettes     Start date: 2011     Quit date: 2019     Years since quittin.0    Smokeless tobacco: Former     Quit date: 2019    Tobacco comments:     PT states he uses nicotine pouches    Vaping Use    Vaping status: Never Used   Substance Use Topics    Alcohol use: Yes     Comment: one per week or less    Drug use: Not Currently     Types: Cocaine, Marijuana, Benzodiazepines     Comment: mushrooms           2025   STI Screening   New sexual partner(s) since last STI/HIV test? (!) YES         2025   Contraception/Family Planning   Questions about contraception or family planning No       Reviewed and updated as needed this visit by Provider                         Objective    Exam  /80 (BP Location: Right arm, Patient Position: Sitting, Cuff Size: Adult Regular)   Pulse 58   Temp 98.4  F (36.9  C)   Resp 16   Ht 1.862 m (6' 1.31\")   Wt 99.8 kg (220 lb)   SpO2 97%   BMI 28.78 kg/m     Estimated body mass index is 28.78 kg/m  as calculated from the following:    Height as of this encounter: 1.862 " "wen (6' 1.31\").    Weight as of this encounter: 99.8 kg (220 lb).    Physical Exam  GENERAL: alert and no distress  EYES: Eyes grossly normal to inspection, PERRL and conjunctivae and sclerae normal  HENT: ear canals and TM's normal, nose and mouth without ulcers or lesions  NECK: no adenopathy, no asymmetry, masses, or scars  RESP: lungs clear to auscultation - no rales, rhonchi or wheezes  CV: regular rate and rhythm, normal S1 S2, no S3 or S4, no murmur, click or rub, no peripheral edema  ABDOMEN: soft, nontender, no hepatosplenomegaly, no masses and bowel sounds normal   (male): normal male genitalia without lesions or urethral discharge, no hernia  MS: no gross musculoskeletal defects noted, no edema  SKIN: no suspicious lesions or rashes  NEURO: Normal strength and tone, mentation intact and speech normal; fine tremor both hands  PSYCH: mentation appears normal, affect normal/bright        Signed Electronically by: Elio Veliz MD    "

## 2025-07-09 NOTE — PATIENT INSTRUCTIONS
Patient Education   Preventive Care Advice   This is general advice given by our system to help you stay healthy. However, your care team may have specific advice just for you. Please talk to your care team about your preventive care needs.  Nutrition  Eat 5 or more servings of fruits and vegetables each day.  Try wheat bread, brown rice and whole grain pasta (instead of white bread, rice, and pasta).  Get enough calcium and vitamin D. Check the label on foods and aim for 100% of the RDA (recommended daily allowance).  Lifestyle  Exercise at least 150 minutes each week  (30 minutes a day, 5 days a week).  Do muscle strengthening activities 2 days a week. These help control your weight and prevent disease.  No smoking.  Wear sunscreen to prevent skin cancer.  Have a dental exam and cleaning every 6 months.  Yearly exams  See your health care team every year to talk about:  Any changes in your health.  Any medicines your care team has prescribed.  Preventive care, family planning, and ways to prevent chronic diseases.  Shots (vaccines)   HPV shots (up to age 26), if you've never had them before.  Hepatitis B shots (up to age 59), if you've never had them before.  COVID-19 shot: Get this shot when it's due.  Flu shot: Get a flu shot every year.  Tetanus shot: Get a tetanus shot every 10 years.  Pneumococcal, hepatitis A, and RSV shots: Ask your care team if you need these based on your risk.  Shingles shot (for age 50 and up)  General health tests  Diabetes screening:  Starting at age 35, Get screened for diabetes at least every 3 years.  If you are younger than age 35, ask your care team if you should be screened for diabetes.  Cholesterol test: At age 39, start having a cholesterol test every 5 years, or more often if advised.  Bone density scan (DEXA): At age 50, ask your care team if you should have this scan for osteoporosis (brittle bones).  Hepatitis C: Get tested at least once in your life.  STIs (sexually  transmitted infections)  Before age 24: Ask your care team if you should be screened for STIs.  After age 24: Get screened for STIs if you're at risk. You are at risk for STIs (including HIV) if:  You are sexually active with more than one person.  You don't use condoms every time.  You or a partner was diagnosed with a sexually transmitted infection.  If you are at risk for HIV, ask about PrEP medicine to prevent HIV.  Get tested for HIV at least once in your life, whether you are at risk for HIV or not.  Cancer screening tests  Cervical cancer screening: If you have a cervix, begin getting regular cervical cancer screening tests starting at age 21.  Breast cancer scan (mammogram): If you've ever had breasts, begin having regular mammograms starting at age 40. This is a scan to check for breast cancer.  Colon cancer screening: It is important to start screening for colon cancer at age 45.  Have a colonoscopy test every 10 years (or more often if you're at risk) Or, ask your provider about stool tests like a FIT test every year or Cologuard test every 3 years.  To learn more about your testing options, visit:   .  For help making a decision, visit:   https://bit.ly/ut76566.  Prostate cancer screening test: If you have a prostate, ask your care team if a prostate cancer screening test (PSA) at age 55 is right for you.  Lung cancer screening: If you are a current or former smoker ages 50 to 80, ask your care team if ongoing lung cancer screenings are right for you.  For informational purposes only. Not to replace the advice of your health care provider. Copyright   2023 [facility]. All rights reserved. Clinically reviewed by the [facility]. Fotofeedback [id number] - REV 01/24.  Learning About Stress  What is stress?     Stress is your body's response to a hard situation. Your body can have a physical, emotional, or mental response. Stress is a fact of life for most people, and it  affects everyone differently. What causes stress for you may not be stressful for someone else.  A lot of things can cause stress. You may feel stress when you go on a job interview, take a test, or run a race. This kind of short-term stress is normal and even useful. It can help you if you need to work hard or react quickly. For example, stress can help you finish an important job on time.  Long-term stress is caused by ongoing stressful situations or events. Examples of long-term stress include long-term health problems, ongoing problems at work, or conflicts in your family. Long-term stress can harm your health.  How does stress affect your health?  When you are stressed, your body responds as though you are in danger. It makes hormones that speed up your heart, make you breathe faster, and give you a burst of energy. This is called the fight-or-flight stress response. If the stress is over quickly, your body goes back to normal and no harm is done.  But if stress happens too often or lasts too long, it can have bad effects. Long-term stress can make you more likely to get sick, and it can make symptoms of some diseases worse. If you tense up when you are stressed, you may develop neck, shoulder, or low back pain. Stress is linked to high blood pressure and heart disease.  Stress also harms your emotional health. It can make you martinez, tense, or depressed. Your relationships may suffer, and you may not do well at work or school.  What can you do to manage stress?  You can try these things to help manage stress:   Do something active. Exercise or activity can help reduce stress. Walking is a great way to get started. Even everyday activities such as housecleaning or yard work can help.  Try yoga or talat chi. These techniques combine exercise and meditation. You may need some training at first to learn them.  Do something you enjoy. For example, listen to music or go to a movie. Practice your hobby or do volunteer  "work.  Meditate. This can help you relax, because you are not worrying about what happened before or what may happen in the future.  Do guided imagery. Imagine yourself in any setting that helps you feel calm. You can use online videos, books, or a teacher to guide you.  Do breathing exercises. For example:  From a standing position, bend forward from the waist with your knees slightly bent. Let your arms dangle close to the floor.  Breathe in slowly and deeply as you return to a standing position. Roll up slowly and lift your head last.  Hold your breath for just a few seconds in the standing position.  Breathe out slowly and bend forward from the waist.  Let your feelings out. Talk, laugh, cry, and express anger when you need to. Talking with supportive friends or family, a counselor, or a selma leader about your feelings is a healthy way to relieve stress. Avoid discussing your feelings with people who make you feel worse.  Write. It may help to write about things that are bothering you. This helps you find out how much stress you feel and what is causing it. When you know this, you can find better ways to cope.  What can you do to prevent stress?  You might try some of these things to help prevent stress:  Manage your time. This helps you find time to do the things you want and need to do.  Get enough sleep. Your body recovers from the stresses of the day while you are sleeping.  Get support. Your family, friends, and community can make a difference in how you experience stress.  Limit your news feed. Avoid or limit time on social media or news that may make you feel stressed.  Do something active. Exercise or activity can help reduce stress. Walking is a great way to get started.  Where can you learn more?  Go to https://www.WeHack.It.net/patiented  Enter N032 in the search box to learn more about \"Learning About Stress.\"  Current as of: October 24, 2024  Content Version: 14.5 2024-2025 [employer]   Care instructions adapted under license by your healthcare professional. If you have questions about a medical condition or this instruction, always ask your healthcare professional. [employer] disclaims any warranty or liability for your use of this information.    Recovering From Depression: Care Instructions  Overview    Sticking to your treatment plan is important as you recover from depression. It may take time for your symptoms to get better after you start treatment. Try not to give up if you don't feel better right away. Make sure you keep going to counseling and taking any prescribed medicine if they are part of your treatment plan.  Focus on things that can help you feel better, such as being with friends and family. Try to eat healthy foods, be active, and get enough sleep. Take things slowly as you begin to recover.  Follow-up care is a key part of your treatment and safety. Be sure to make and go to all appointments, and call your doctor if you are having problems. It's also a good idea to know your test results and keep a list of the medicines you take.  How can you care for yourself at home?  Be realistic  If you have a large task to do, break it up into smaller steps you can handle, and just do what you can.  You may want to put off important decisions until your depression has lifted. If you have plans that will have a major impact on your life, such as marriage, divorce, or a job change, try to wait a bit. Talk it over with friends and loved ones who can help you look at the overall picture first.  Reaching out to people for help is important. Do not isolate yourself. Let your family and friends help you. Find someone you can trust and confide in, and talk to that person.  Be patient, and be kind to yourself. Remember that depression is not your fault and is not something you can overcome with willpower alone. Treatment is important for depression, just like for any other  illness. Feeling better takes time, and your mood will improve little by little.  Stay active  Stay busy and get outside. Take a walk, or try some other light exercise.  Talk with your doctor about an exercise program. Exercise can help with mild depression.  Go to a movie or concert. Take part in a Gnosticist activity or other social gathering. Go to a ball game.  Ask a friend to have dinner with you.  Take care of yourself  Eat healthy foods such as fresh fruits and vegetables, whole grains, and lean protein. If you have lost your appetite, eat small snacks rather than large meals.  Avoid using marijuana and other drugs and drinking alcohol. Do not take medicines that have not been prescribed for you. They may interfere with medicines you may be taking for depression, or they may make your depression worse.  Take your medicines exactly as they are prescribed. You may start to feel better within 1 to 3 weeks of taking antidepressant medicine. But it can take as many as 6 to 8 weeks to see more improvement. If you have questions or concerns about your medicines, or if you do not notice any improvement by 3 weeks, talk to your doctor.  Continue to take your medicine after your symptoms improve. Taking your medicine for at least 6 months after you feel better can help keep you from getting depressed again. If this isn't the first time you have been depressed, your doctor may recommend you to take medicine even longer.  If you have any side effects from your medicine, tell your doctor. Many side effects are mild and will go away on their own after you have been taking the medicine for a few weeks. Some may last longer. Talk to your doctor if side effects are bothering you too much. You might be able to try a different medicine.  Continue counseling. It may help prevent depression from returning, especially if you've had multiple episodes of depression. Talk with your counselor if you are having a hard time attending your  sessions or you think the sessions aren't working. Don't just stop going.  Get enough sleep. Talk to your doctor if you are having problems sleeping.  Avoid sleeping pills unless they are prescribed by the doctor treating your depression. Sleeping pills may make you groggy during the day, and they may interact with other medicine you are taking.  If you have any other illnesses, such as diabetes, heart disease, or high blood pressure, make sure to continue with your treatment. Tell your doctor about all of the medicines you take, including those with or without a prescription.  Where to get help 24 hours a day, 7 days a week  If you or someone you know talks about suicide, self-harm, a mental health crisis, a substance use crisis, or any other kind of emotional distress, get help right away. You can:  Call the Suicide and Crisis Lifeline at FullStory.  Text HOME to 537108 to access the Crisis Text Line.  Consider saving these numbers in your phone.  Go to [employer] for more information or to chat online.  Call 101 anytime you think you may need emergency care. For example, call if:  You feel like hurting yourself or someone else.  Someone you know has depression and is about to attempt or is attempting suicide.  Where to get help 24 hours a day, 7 days a week  If you or someone you know talks about suicide, self-harm, a mental health crisis, a substance use crisis, or any other kind of emotional distress, get help right away. You can:  Call the Suicide and Crisis Lifeline at 323.  Text HOME to 777153 to access the Crisis Text Line.  Consider saving these numbers in your phone.  Go to [employer] for more information or to chat online.  Call your doctor now or seek immediate medical care if:  You hear voices.  Someone you know has depression and:  Starts to give away possessions.  Uses illegal drugs or drinks alcohol heavily.  Talks or writes about death, including writing suicide notes or talking about guns,  "knives, or pills.  Starts to spend a lot of time alone.  Acts very aggressively or suddenly appears calm.  Watch closely for changes in your health, and be sure to contact your doctor if:  You do not get better as expected.  Where can you learn more?  Go to https://www.Grabhouse.net/patiented  Enter N529 in the search box to learn more about \"Recovering From Depression: Care Instructions.\"  Current as of: July 31, 2024  Content Version: 14.5    0211-6113 [employer].   Care instructions adapted under license by your healthcare professional. If you have questions about a medical condition or this instruction, always ask your healthcare professional. [employer] disclaims any warranty or liability for your use of this information.    Substance Use Disorder: Care Instructions  Overview     You can improve your life and health by stopping your use of alcohol or drugs. When you don't drink or use drugs, you may feel and sleep better. You may get along better with your family, friends, and coworkers. There are medicines and programs that can help with substance use disorder.  How can you care for yourself at home?  Here are some ways to help you stay sober and prevent relapse.  If you have been given medicine to help keep you sober or reduce your cravings, be sure to take it exactly as prescribed.  Talk to your doctor about programs that can help you stop using drugs or drinking alcohol.  Do not keep alcohol or drugs in your home.  Plan ahead. Think about what you'll say if other people ask you to drink or use drugs. Try not to spend time with people who drink or use drugs.  Use the time and money spent on drinking or drugs to do something that's important to you.  Preventing a relapse  Have a plan to deal with relapse. Learn to recognize changes in your thinking that lead you to drink or use drugs. Get help before you start to drink or use drugs again.  Try to stay away from situations, friends, or " places that may lead you to drink or use drugs.  If you feel the need to drink alcohol or use drugs again, seek help right away. Call a trusted friend or family member. Some people get support from organizations such as Narcotics Anonymous or [employer] or from treatment facilities.  If you relapse, get help as soon as you can. Some people make a plan with another person that outlines what they want that person to do for them if they relapse. The plan usually includes how to handle the relapse and who to notify in case of relapse.  Don't give up. Remember that a relapse doesn't mean that you have failed. Use the experience to learn the triggers that lead you to drink or use drugs. Then quit again. Recovery is a lifelong process. Many people have several relapses before they are able to quit for good.  Follow-up care is a key part of your treatment and safety. Be sure to make and go to all appointments, and call your doctor if you are having problems. It's also a good idea to know your test results and keep a list of the medicines you take.  When should you call for help?   Call 201  anytime you think you may need emergency care. For example, call if you or someone else:    Has overdosed or has withdrawal signs. Be sure to tell the emergency workers that you are or someone else is using or trying to quit using drugs. Overdose or withdrawal signs may include:  Losing consciousness.  Seizure.  Seeing or hearing things that aren't there (hallucinations).     Is thinking or talking about suicide or harming others.   Where to get help 24 hours a day, 7 days a week   If you or someone you know talks about suicide, self-harm, a mental health crisis, a substance use crisis, or any other kind of emotional distress, get help right away. You can:    Call the Suicide and Crisis Lifeline at 783.     Call 3-623-478-TALK (1-776.677.1600).     Text HOME to 250348 to access the Crisis Text Line.   Consider saving these numbers in  "your phone.  Go to [employer] for more information or to chat online.  Call your doctor now or seek immediate medical care if:    You are having withdrawal symptoms. These may include nausea or vomiting, sweating, shakiness, and anxiety.   Watch closely for changes in your health, and be sure to contact your doctor if:    You have a relapse.     You need more help or support to stop.   Where can you learn more?  Go to https://www.Hingi.[employer]/patiented  Enter H573 in the search box to learn more about \"Substance Use Disorder: Care Instructions.\"  Current as of: August 20, 2024  Content Version: 14.5    3843-8027 [employer].   Care instructions adapted under license by your healthcare professional. If you have questions about a medical condition or this instruction, always ask your healthcare professional. [employer] disclaims any warranty or liability for your use of this information.    Safer Sex: Care Instructions  Overview  Safer sex is a way to reduce your risk of getting a sexually transmitted infection (STI). It can also help prevent pregnancy.  Several products can help you practice safer sex and reduce your chance of STIs. One of the best is a condom. There are internal and external condoms. You can use a special rubber sheet (dental dam) for protection during oral sex. Disposable gloves can keep your hands from touching blood, semen, or other body fluids that can carry infections.  Remember that birth control methods such as diaphragms, IUDs, foams, and birth control pills do not stop you from getting STIs.  Follow-up care is a key part of your treatment and safety. Be sure to make and go to all appointments, and call your doctor if you are having problems. It's also a good idea to know your test results and keep a list of the medicines you take.  How can you care for yourself at home?  Think about getting vaccinated to help prevent hepatitis A, hepatitis B, and human " "papillomavirus (HPV). They can be spread through sex.  Use a condom every time you have sex. Use an external condom, which goes on the penis. Or use an internal condom, which goes into the vagina or anus.  Make sure you use the right size external condom. A condom that's too small can break easily. A condom that's too big can slip off during sex.  Use a new condom each time you have sex. Be careful not to poke a hole in the condom when you open the wrapper.  Don't use an internal condom and an external condom at the same time.  Never use petroleum jelly (such as Vaseline), grease, hand lotion, baby oil, or anything with oil in it. These products can make holes in the condom.  After intercourse, hold the edge of the condom as you remove it. This will help keep semen from spilling out of the condom.  Do not have sex with anyone who has symptoms of an STI, such as sores on the genitals or mouth.  Do not drink a lot of alcohol or use drugs before sex.  Limit your sex partners. Sex with one partner who has sex only with you can reduce your risk of getting an STI.  Don't share sex toys. But if you do share them, use a condom and clean the sex toys between each use.  Talk to any partners before you have sex. Talk about what you feel comfortable with and whether you have any boundaries with sex. And find out if your partner or partners may be at risk for any STI. Keep in mind that a person may be able to spread an STI even if they do not have symptoms. You and any partners may want to get tested for STIs.  Where can you learn more?  Go to https://www.healthGeenapp.net/patiented  Enter B608 in the search box to learn more about \"Safer Sex: Care Instructions.\"  Current as of: April 30, 2024  Content Version: 14.5 2024-2025 [employer].   Care instructions adapted under license by your healthcare professional. If you have questions about a medical condition or this instruction, always ask your healthcare " professional. [facility] [employer] disclaims any warranty or liability for your use of this information.

## 2025-07-11 NOTE — TELEPHONE ENCOUNTER
If he is referring to free t as testosterone, I did not do that  If it refers to free t4 thyroid lab, I did run that it was normal  After labs all back I will notify him of my ideas: I know he moves to [place] soon I might recheck hgb and a few more celiac labs before he goes

## 2025-07-12 LAB
GLIADIN IGA SER-ACNC: 1.6 U/ML
GLIADIN IGG SER-ACNC: <0.6 U/ML

## 2025-07-17 NOTE — TELEPHONE ENCOUNTER
Cbc/diff  Iron   TIBC  Ferritin  Tissue transglutamine  Folate    Bloods re: anemia    Do those lab only soon, let pt know

## 2025-07-21 ENCOUNTER — LAB (OUTPATIENT)
Dept: LAB | Facility: CLINIC | Age: 30
End: 2025-07-21
Payer: COMMERCIAL

## 2025-07-21 DIAGNOSIS — D64.9 ANEMIA, UNSPECIFIED TYPE: ICD-10-CM

## 2025-07-21 DIAGNOSIS — Z00.00 HEALTH CARE MAINTENANCE: ICD-10-CM

## 2025-07-21 LAB
BASOPHILS # BLD AUTO: 0 10E3/UL (ref 0–0.2)
BASOPHILS NFR BLD AUTO: 1 %
EOSINOPHIL # BLD AUTO: 0.3 10E3/UL (ref 0–0.7)
EOSINOPHIL NFR BLD AUTO: 4 %
ERYTHROCYTE [DISTWIDTH] IN BLOOD BY AUTOMATED COUNT: 16.2 % (ref 10–15)
EST. AVERAGE GLUCOSE BLD GHB EST-MCNC: 108 MG/DL
FERRITIN SERPL-MCNC: 27 NG/ML (ref 31–409)
FOLATE SERPL-MCNC: 11.2 NG/ML (ref 4.6–34.8)
HBA1C MFR BLD: 5.4 % (ref 0–5.6)
HCT VFR BLD AUTO: 41 % (ref 40–53)
HGB BLD-MCNC: 13.1 G/DL (ref 13.3–17.7)
IMM GRANULOCYTES # BLD: 0 10E3/UL
IMM GRANULOCYTES NFR BLD: 0 %
IRON BINDING CAPACITY (ROCHE): 345 UG/DL (ref 240–430)
IRON SATN MFR SERPL: 18 % (ref 15–46)
IRON SERPL-MCNC: 63 UG/DL (ref 61–157)
LYMPHOCYTES # BLD AUTO: 1.8 10E3/UL (ref 0.8–5.3)
LYMPHOCYTES NFR BLD AUTO: 23 %
MCH RBC QN AUTO: 25.5 PG (ref 26.5–33)
MCHC RBC AUTO-ENTMCNC: 32 G/DL (ref 31.5–36.5)
MCV RBC AUTO: 80 FL (ref 78–100)
MONOCYTES # BLD AUTO: 0.8 10E3/UL (ref 0–1.3)
MONOCYTES NFR BLD AUTO: 10 %
NEUTROPHILS # BLD AUTO: 5 10E3/UL (ref 1.6–8.3)
NEUTROPHILS NFR BLD AUTO: 63 %
PLATELET # BLD AUTO: 312 10E3/UL (ref 150–450)
RBC # BLD AUTO: 5.14 10E6/UL (ref 4.4–5.9)
WBC # BLD AUTO: 8 10E3/UL (ref 4–11)

## 2025-07-21 PROCEDURE — 83550 IRON BINDING TEST: CPT

## 2025-07-21 PROCEDURE — 82728 ASSAY OF FERRITIN: CPT

## 2025-07-21 PROCEDURE — 83540 ASSAY OF IRON: CPT

## 2025-07-21 PROCEDURE — 82746 ASSAY OF FOLIC ACID SERUM: CPT

## 2025-07-21 PROCEDURE — 86364 TISS TRNSGLTMNASE EA IG CLAS: CPT

## 2025-07-21 PROCEDURE — 84403 ASSAY OF TOTAL TESTOSTERONE: CPT

## 2025-07-21 PROCEDURE — 36415 COLL VENOUS BLD VENIPUNCTURE: CPT

## 2025-07-21 PROCEDURE — 85025 COMPLETE CBC W/AUTO DIFF WBC: CPT

## 2025-07-21 PROCEDURE — 83036 HEMOGLOBIN GLYCOSYLATED A1C: CPT

## 2025-07-22 LAB
TTG IGA SER-ACNC: 0.3 U/ML
TTG IGG SER-ACNC: <0.6 U/ML

## 2025-07-23 ENCOUNTER — E-CONSULT (OUTPATIENT)
Dept: ONCOLOGY | Facility: CLINIC | Age: 30
End: 2025-07-23
Payer: COMMERCIAL

## 2025-07-23 LAB — TESTOST SERPL-MCNC: 802 NG/DL (ref 240–950)

## 2025-07-23 PROCEDURE — 99451 NTRPROF PH1/NTRNET/EHR 5/>: CPT | Performed by: INTERNAL MEDICINE

## 2025-07-23 NOTE — PROGRESS NOTES
7/23/2025     E-Consult has been accepted.    Interprofessional consultation requested by:  Elio Veliz MD      Clinical Question/Purpose: MY CLINICAL QUESTION IS: Anemia. Unexpected. No history of or GI symptoms. Labs were done at a regular check up as pt stated somewhat fatigued. Moving to [place] in a few weeks, permanently. Is there any further eval you can suggest in the meantime; if he is iron deficient, we don't have a good reason why.     Patient assessment and information reviewed: 30yo man with anxiety depression ,urinary sx on amoxetine and sertatline  Pt has lowish ferritin 27 and iron sat 18% MCV only 80.Why should he have such low ferritin? Bleeding? UGIsx? Melena? Diet? Vegan? Malabsorption?   Latest Reference Range & Units 07/21/25 15:42   Ferritin 31 - 409 ng/mL 27 (L)   Folate 4.6 - 34.8 ng/mL 11.2   Estimated Average Glucose <117 mg/dL 108   Hemoglobin A1C 0.0 - 5.6 % 5.4   Iron 61 - 157 ug/dL 63   Iron Binding Capacity 240 - 430 ug/dL 345   Iron Sat Index 15 - 46 % 18   Testosterone Total 240 - 950 ng/dL 802   Tissue Transglutaminase Antibody IgA <7.0 U/mL 0.3   Tissue Transglutaminase Kellee IgG <7.0 U/mL <0.6   WBC 4.0 - 11.0 10e3/uL 8.0   Hemoglobin 13.3 - 17.7 g/dL 13.1 (L)   Hematocrit 40.0 - 53.0 % 41.0   Platelet Count 150 - 450 10e3/uL 312   RBC Count 4.40 - 5.90 10e6/uL 5.14   MCV 78 - 100 fL 80   MCH 26.5 - 33.0 pg 25.5 (L)   MCHC 31.5 - 36.5 g/dL 32.0   RDW 10.0 - 15.0 % 16.2 (H)   % Neutrophils % 63   % Lymphocytes % 23   % Monocytes % 10   % Eosinophils % 4   % Basophils % 1   % Immature Granulocytes % 0   Absolute Neutrophil 1.6 - 8.3 10e3/uL 5.0   Absolute Lymphocytes 0.8 - 5.3 10e3/uL 1.8   Absolute Monocytes 0.0 - 1.3 10e3/uL 0.8   Absolute Eosinophils 0.0 - 0.7 10e3/uL 0.3   Absolute Basophils 0.0 - 0.2 10e3/uL 0.0   Absolute Immature Granulocytes <=0.4 10e3/uL 0.0   (L): Data is abnormally low  (H): Data is abnormally high  Recommendations:    Blood smear for morphology,  retic count Transferrin receptor, LDH, stool FIT, consider UGI endoscopy, If all negative consider Fe SO4 MWF    The recommendations provided in this E-Consult are based on a review of clinical data pertinent to the clinical question presented, without a review of the patient's complete medical record or, the benefit of a comprehensive in-person or virtual patient evaluation. This consultation should not replace the clinical judgement and evaluation of the provider ordering this E-Consult. Any new clinical issues, or changes in patient status since the filing of this E-Consult will need to be taken into account when assessing these recommendations. Please contact me if you have further questions.    My total time spent reviewing clinical information and formulating assessment was 30 minutes.        Sunil Hicks MD

## 2025-08-05 ENCOUNTER — LAB (OUTPATIENT)
Dept: LAB | Facility: CLINIC | Age: 30
End: 2025-08-05
Payer: COMMERCIAL

## 2025-08-05 ENCOUNTER — MYC MEDICAL ADVICE (OUTPATIENT)
Dept: FAMILY MEDICINE | Facility: CLINIC | Age: 30
End: 2025-08-05

## 2025-08-05 DIAGNOSIS — R53.83 FATIGUE, UNSPECIFIED TYPE: Primary | ICD-10-CM

## 2025-08-05 DIAGNOSIS — D64.9 ANEMIA: ICD-10-CM

## 2025-08-05 DIAGNOSIS — R53.83 FATIGUE, UNSPECIFIED TYPE: ICD-10-CM

## 2025-08-05 LAB
BASOPHILS # BLD AUTO: 0 10E3/UL (ref 0–0.2)
BASOPHILS NFR BLD AUTO: 1 %
CORTIS SERPL-MCNC: 8.1 UG/DL
EOSINOPHIL # BLD AUTO: 0.3 10E3/UL (ref 0–0.7)
EOSINOPHIL NFR BLD AUTO: 4 %
ERYTHROCYTE [DISTWIDTH] IN BLOOD BY AUTOMATED COUNT: 16.1 % (ref 10–15)
HCT VFR BLD AUTO: 42.8 % (ref 40–53)
HGB BLD-MCNC: 13.6 G/DL (ref 13.3–17.7)
IMM GRANULOCYTES # BLD: 0 10E3/UL
IMM GRANULOCYTES NFR BLD: 0 %
LYMPHOCYTES # BLD AUTO: 1.4 10E3/UL (ref 0.8–5.3)
LYMPHOCYTES NFR BLD AUTO: 20 %
MCH RBC QN AUTO: 26.2 PG (ref 26.5–33)
MCHC RBC AUTO-ENTMCNC: 31.8 G/DL (ref 31.5–36.5)
MCV RBC AUTO: 82 FL (ref 78–100)
MONOCYTES # BLD AUTO: 0.7 10E3/UL (ref 0–1.3)
MONOCYTES NFR BLD AUTO: 10 %
NEUTROPHILS # BLD AUTO: 4.8 10E3/UL (ref 1.6–8.3)
NEUTROPHILS NFR BLD AUTO: 66 %
PLATELET # BLD AUTO: 281 10E3/UL (ref 150–450)
RBC # BLD AUTO: 5.2 10E6/UL (ref 4.4–5.9)
RETICS # AUTO: 0.06 10E6/UL (ref 0.03–0.1)
RETICS/RBC NFR AUTO: 1.2 % (ref 0.5–2)
TRANSFERRIN SERPL-MCNC: 274 MG/DL (ref 200–360)
WBC # BLD AUTO: 7.2 10E3/UL (ref 4–11)

## 2025-08-05 PROCEDURE — 36415 COLL VENOUS BLD VENIPUNCTURE: CPT

## 2025-08-05 PROCEDURE — 85025 COMPLETE CBC W/AUTO DIFF WBC: CPT

## 2025-08-05 PROCEDURE — 82533 TOTAL CORTISOL: CPT

## 2025-08-05 PROCEDURE — 84466 ASSAY OF TRANSFERRIN: CPT

## 2025-08-05 PROCEDURE — 99207 BLOOD MORPHOLOGY PATHOLOGIST REVIEW: CPT | Performed by: PATHOLOGY

## 2025-08-05 PROCEDURE — 85045 AUTOMATED RETICULOCYTE COUNT: CPT

## 2025-08-06 ENCOUNTER — RESULTS FOLLOW-UP (OUTPATIENT)
Dept: FAMILY MEDICINE | Facility: CLINIC | Age: 30
End: 2025-08-06
Payer: COMMERCIAL

## 2025-08-06 LAB
PATH REPORT.COMMENTS IMP SPEC: NORMAL
PATH REPORT.FINAL DX SPEC: NORMAL
PATH REPORT.MICROSCOPIC SPEC OTHER STN: NORMAL
PATH REPORT.MICROSCOPIC SPEC OTHER STN: NORMAL
PATH REPORT.RELEVANT HX SPEC: NORMAL

## 2025-08-11 LAB — HEMOCCULT STL QL IA: NEGATIVE

## 2025-08-12 ENCOUNTER — LAB (OUTPATIENT)
Dept: LAB | Facility: CLINIC | Age: 30
End: 2025-08-12
Payer: COMMERCIAL

## 2025-08-12 ENCOUNTER — OFFICE VISIT (OUTPATIENT)
Dept: INTERNAL MEDICINE | Facility: CLINIC | Age: 30
End: 2025-08-12
Payer: COMMERCIAL

## 2025-08-12 VITALS
SYSTOLIC BLOOD PRESSURE: 133 MMHG | HEART RATE: 77 BPM | WEIGHT: 222.2 LBS | TEMPERATURE: 97.4 F | RESPIRATION RATE: 16 BRPM | HEIGHT: 73 IN | BODY MASS INDEX: 29.45 KG/M2 | DIASTOLIC BLOOD PRESSURE: 78 MMHG | OXYGEN SATURATION: 99 %

## 2025-08-12 DIAGNOSIS — R53.83 OTHER FATIGUE: ICD-10-CM

## 2025-08-12 DIAGNOSIS — M25.552 HIP PAIN, LEFT: ICD-10-CM

## 2025-08-12 DIAGNOSIS — R53.83 OTHER FATIGUE: Primary | ICD-10-CM

## 2025-08-12 LAB
ALBUMIN UR-MCNC: NEGATIVE MG/DL
APPEARANCE UR: CLEAR
BILIRUB UR QL STRIP: NEGATIVE
COLOR UR AUTO: ABNORMAL
CRP SERPL-MCNC: <3 MG/L
ERYTHROCYTE [SEDIMENTATION RATE] IN BLOOD BY WESTERGREN METHOD: 7 MM/HR (ref 0–15)
GLUCOSE UR STRIP-MCNC: NEGATIVE MG/DL
HGB UR QL STRIP: NEGATIVE
HOLD SPECIMEN: NORMAL
KETONES UR STRIP-MCNC: NEGATIVE MG/DL
LEUKOCYTE ESTERASE UR QL STRIP: ABNORMAL
NITRATE UR QL: NEGATIVE
PH UR STRIP: 6.5 [PH] (ref 5–7)
RBC URINE: <1 /HPF
SP GR UR STRIP: 1.02 (ref 1–1.03)
UROBILINOGEN UR STRIP-MCNC: NORMAL MG/DL
WBC URINE: 1 /HPF

## 2025-08-12 PROCEDURE — 85652 RBC SED RATE AUTOMATED: CPT | Performed by: PATHOLOGY

## 2025-08-12 PROCEDURE — 81001 URINALYSIS AUTO W/SCOPE: CPT | Performed by: PATHOLOGY

## 2025-08-12 PROCEDURE — 36415 COLL VENOUS BLD VENIPUNCTURE: CPT | Performed by: PATHOLOGY

## 2025-08-12 PROCEDURE — 87591 N.GONORRHOEAE DNA AMP PROB: CPT | Performed by: INTERNAL MEDICINE

## 2025-08-12 PROCEDURE — 99000 SPECIMEN HANDLING OFFICE-LAB: CPT | Performed by: PATHOLOGY

## 2025-08-12 PROCEDURE — 86140 C-REACTIVE PROTEIN: CPT | Performed by: PATHOLOGY

## 2025-08-12 ASSESSMENT — PATIENT HEALTH QUESTIONNAIRE - PHQ9
SUM OF ALL RESPONSES TO PHQ QUESTIONS 1-9: 17
SUM OF ALL RESPONSES TO PHQ QUESTIONS 1-9: 17
10. IF YOU CHECKED OFF ANY PROBLEMS, HOW DIFFICULT HAVE THESE PROBLEMS MADE IT FOR YOU TO DO YOUR WORK, TAKE CARE OF THINGS AT HOME, OR GET ALONG WITH OTHER PEOPLE: EXTREMELY DIFFICULT

## 2025-08-13 LAB
C TRACH DNA SPEC QL PROBE+SIG AMP: NEGATIVE
N GONORRHOEA DNA SPEC QL NAA+PROBE: NEGATIVE
SPECIMEN TYPE: NORMAL

## (undated) DEVICE — SPECIMEN CONTAINER 3OZ W/FORMALIN 59901

## (undated) DEVICE — GOWN IMPERVIOUS 2XL BLUE

## (undated) DEVICE — KIT ENDO TURNOVER/PROCEDURE CARRY-ON 101822

## (undated) DEVICE — SOL WATER IRRIG 500ML BOTTLE 2F7113

## (undated) DEVICE — TUBING SUCTION 12"X1/4" N612

## (undated) DEVICE — ENDO FORCEP BX CAPTURA PRO SPIKE G50696

## (undated) DEVICE — KIT ENDO FIRST STEP DISINFECTANT 200ML W/POUCH EP-4

## (undated) DEVICE — SUCTION MANIFOLD NEPTUNE 2 SYS 1 PORT 702-025-000